# Patient Record
Sex: MALE | Race: WHITE | Employment: FULL TIME | URBAN - METROPOLITAN AREA
[De-identification: names, ages, dates, MRNs, and addresses within clinical notes are randomized per-mention and may not be internally consistent; named-entity substitution may affect disease eponyms.]

---

## 2018-05-23 ENCOUNTER — HOSPITAL ENCOUNTER (EMERGENCY)
Age: 48
Discharge: HOME | End: 2018-05-23

## 2018-05-23 VITALS
SYSTOLIC BLOOD PRESSURE: 156 MMHG | DIASTOLIC BLOOD PRESSURE: 96 MMHG | HEIGHT: 71 IN | TEMPERATURE: 98.7 F | WEIGHT: 165 LBS | HEART RATE: 99 BPM | BODY MASS INDEX: 23.1 KG/M2 | RESPIRATION RATE: 16 BRPM | OXYGEN SATURATION: 99 %

## 2018-05-23 DIAGNOSIS — H65.01 RIGHT ACUTE SEROUS OTITIS MEDIA, RECURRENCE NOT SPECIFIED: Primary | ICD-10-CM

## 2018-05-23 DIAGNOSIS — J02.9 ACUTE PHARYNGITIS, UNSPECIFIED ETIOLOGY: ICD-10-CM

## 2018-05-23 DIAGNOSIS — Z72.0 TOBACCO ABUSE DISORDER: ICD-10-CM

## 2018-05-23 PROCEDURE — 99282 EMERGENCY DEPT VISIT SF MDM: CPT

## 2018-05-23 RX ORDER — PREDNISONE 10 MG/1
TABLET ORAL
Qty: 20 TABLET | Refills: 0 | Status: SHIPPED | OUTPATIENT
Start: 2018-05-23 | End: 2018-06-02

## 2018-05-23 RX ORDER — AMOXICILLIN 500 MG/1
500 CAPSULE ORAL 3 TIMES DAILY
Qty: 30 CAPSULE | Refills: 0 | Status: SHIPPED | OUTPATIENT
Start: 2018-05-23 | End: 2018-06-02

## 2018-05-23 ASSESSMENT — PAIN DESCRIPTION - LOCATION: LOCATION: EAR;THROAT

## 2018-05-23 ASSESSMENT — PAIN DESCRIPTION - DESCRIPTORS: DESCRIPTORS: ACHING

## 2018-05-23 ASSESSMENT — PAIN DESCRIPTION - PAIN TYPE: TYPE: ACUTE PAIN

## 2018-05-23 ASSESSMENT — PAIN SCALES - GENERAL: PAINLEVEL_OUTOF10: 5

## 2018-05-23 ASSESSMENT — PAIN DESCRIPTION - ORIENTATION: ORIENTATION: RIGHT

## 2019-09-23 ENCOUNTER — OFFICE VISIT (OUTPATIENT)
Dept: FAMILY MEDICINE CLINIC | Age: 49
End: 2019-09-23
Payer: COMMERCIAL

## 2019-09-23 VITALS
HEART RATE: 103 BPM | TEMPERATURE: 97.2 F | DIASTOLIC BLOOD PRESSURE: 80 MMHG | WEIGHT: 151 LBS | SYSTOLIC BLOOD PRESSURE: 138 MMHG | BODY MASS INDEX: 21.14 KG/M2 | OXYGEN SATURATION: 100 % | HEIGHT: 71 IN

## 2019-09-23 DIAGNOSIS — R42 DIZZINESS: Primary | ICD-10-CM

## 2019-09-23 DIAGNOSIS — H65.03 NON-RECURRENT ACUTE SEROUS OTITIS MEDIA OF BOTH EARS: ICD-10-CM

## 2019-09-23 PROCEDURE — G8420 CALC BMI NORM PARAMETERS: HCPCS | Performed by: INTERNAL MEDICINE

## 2019-09-23 PROCEDURE — G8427 DOCREV CUR MEDS BY ELIG CLIN: HCPCS | Performed by: INTERNAL MEDICINE

## 2019-09-23 PROCEDURE — 99213 OFFICE O/P EST LOW 20 MIN: CPT | Performed by: INTERNAL MEDICINE

## 2019-09-23 PROCEDURE — 4004F PT TOBACCO SCREEN RCVD TLK: CPT | Performed by: INTERNAL MEDICINE

## 2019-09-23 PROCEDURE — 3017F COLORECTAL CA SCREEN DOC REV: CPT | Performed by: INTERNAL MEDICINE

## 2019-09-23 RX ORDER — PREDNISONE 10 MG/1
TABLET ORAL
Qty: 30 TABLET | Refills: 0 | Status: SHIPPED | OUTPATIENT
Start: 2019-09-23 | End: 2019-10-22

## 2019-09-23 RX ORDER — AMOXICILLIN 875 MG/1
875 TABLET, COATED ORAL 2 TIMES DAILY
Qty: 14 TABLET | Refills: 0 | Status: SHIPPED | OUTPATIENT
Start: 2019-09-23 | End: 2019-09-30

## 2019-09-23 NOTE — LETTER
[facility]  [address] [place] [id number]  Phone: [phone]  Fax: [phone] [place], [place]        September 23, 2019     Patient: Luz Camarillo   YOB: 1970   Date of Visit: 9/23/2019       To Whom it May Concern:    Luz Camarillo was seen in my clinic on 9/23/2019. He may return to work without restrictions on 9-  If you have any questions or concerns, please don't hesitate to call.     Sincerely,         [address] [facility], DO

## 2019-10-22 ENCOUNTER — OFFICE VISIT (OUTPATIENT)
Dept: FAMILY MEDICINE CLINIC | Age: 49
End: 2019-10-22
Payer: COMMERCIAL

## 2019-10-22 VITALS
WEIGHT: 153 LBS | TEMPERATURE: 98.7 F | HEART RATE: 110 BPM | HEIGHT: 71 IN | DIASTOLIC BLOOD PRESSURE: 90 MMHG | BODY MASS INDEX: 21.42 KG/M2 | OXYGEN SATURATION: 99 % | SYSTOLIC BLOOD PRESSURE: 142 MMHG

## 2019-10-22 DIAGNOSIS — R11.0 NAUSEA: Primary | ICD-10-CM

## 2019-10-22 PROCEDURE — G8484 FLU IMMUNIZE NO ADMIN: HCPCS | Performed by: INTERNAL MEDICINE

## 2019-10-22 PROCEDURE — 4004F PT TOBACCO SCREEN RCVD TLK: CPT | Performed by: INTERNAL MEDICINE

## 2019-10-22 PROCEDURE — G8420 CALC BMI NORM PARAMETERS: HCPCS | Performed by: INTERNAL MEDICINE

## 2019-10-22 PROCEDURE — 3017F COLORECTAL CA SCREEN DOC REV: CPT | Performed by: INTERNAL MEDICINE

## 2019-10-22 PROCEDURE — 99213 OFFICE O/P EST LOW 20 MIN: CPT | Performed by: INTERNAL MEDICINE

## 2019-10-22 PROCEDURE — G8427 DOCREV CUR MEDS BY ELIG CLIN: HCPCS | Performed by: INTERNAL MEDICINE

## 2019-10-22 RX ORDER — ONDANSETRON 4 MG/1
4 TABLET, FILM COATED ORAL 3 TIMES DAILY PRN
Qty: 15 TABLET | Refills: 0 | Status: SHIPPED
Start: 2019-10-22 | End: 2020-09-01 | Stop reason: ALTCHOICE

## 2020-01-31 LAB
CHOLESTEROL, TOTAL: 198 MG/DL
CHOLESTEROL/HDL RATIO: 3.7
HDLC SERPL-MCNC: 53 MG/DL (ref 35–70)
LDL CHOLESTEROL CALCULATED: 131 MG/DL (ref 0–160)
NONHDLC SERPL-MCNC: NORMAL MG/DL
TRIGL SERPL-MCNC: 69 MG/DL
VLDLC SERPL CALC-MCNC: 14 MG/DL

## 2020-09-01 ENCOUNTER — OFFICE VISIT (OUTPATIENT)
Dept: FAMILY MEDICINE CLINIC | Age: 50
End: 2020-09-01
Payer: COMMERCIAL

## 2020-09-01 ENCOUNTER — TELEPHONE (OUTPATIENT)
Dept: FAMILY MEDICINE CLINIC | Age: 50
End: 2020-09-01

## 2020-09-01 VITALS
OXYGEN SATURATION: 98 % | WEIGHT: 152 LBS | SYSTOLIC BLOOD PRESSURE: 144 MMHG | HEIGHT: 71 IN | TEMPERATURE: 98 F | BODY MASS INDEX: 21.28 KG/M2 | DIASTOLIC BLOOD PRESSURE: 82 MMHG | HEART RATE: 91 BPM

## 2020-09-01 PROBLEM — R73.9 HYPERGLYCEMIA, UNSPECIFIED: Status: RESOLVED | Noted: 2017-08-15 | Resolved: 2020-09-01

## 2020-09-01 PROBLEM — E78.1 PURE HYPERGLYCERIDEMIA: Status: RESOLVED | Noted: 2017-08-15 | Resolved: 2020-09-01

## 2020-09-01 PROBLEM — R74.01 NONSPECIFIC ELEVATION OF LEVELS OF TRANSAMINASE AND LACTIC ACID DEHYDROGENASE (LDH): Status: ACTIVE | Noted: 2017-08-15

## 2020-09-01 PROBLEM — F10.10 ALCOHOL ABUSE, UNCOMPLICATED: Status: ACTIVE | Noted: 2017-08-15

## 2020-09-01 PROBLEM — R74.02 NONSPECIFIC ELEVATION OF LEVELS OF TRANSAMINASE AND LACTIC ACID DEHYDROGENASE (LDH): Status: ACTIVE | Noted: 2017-08-15

## 2020-09-01 PROBLEM — R74.01 NONSPECIFIC ELEVATION OF LEVELS OF TRANSAMINASE AND LACTIC ACID DEHYDROGENASE (LDH): Status: RESOLVED | Noted: 2017-08-15 | Resolved: 2020-09-01

## 2020-09-01 PROBLEM — R74.02 NONSPECIFIC ELEVATION OF LEVELS OF TRANSAMINASE AND LACTIC ACID DEHYDROGENASE (LDH): Status: RESOLVED | Noted: 2017-08-15 | Resolved: 2020-09-01

## 2020-09-01 PROBLEM — F41.9 ANXIETY DISORDER: Status: ACTIVE | Noted: 2017-08-15

## 2020-09-01 PROBLEM — E78.1 PURE HYPERGLYCERIDEMIA: Status: ACTIVE | Noted: 2017-08-15

## 2020-09-01 PROBLEM — I25.10 CORONARY ARTERIOSCLEROSIS IN NATIVE ARTERY: Status: ACTIVE | Noted: 2017-08-15

## 2020-09-01 PROBLEM — R73.9 HYPERGLYCEMIA, UNSPECIFIED: Status: ACTIVE | Noted: 2017-08-15

## 2020-09-01 PROCEDURE — G8427 DOCREV CUR MEDS BY ELIG CLIN: HCPCS | Performed by: FAMILY MEDICINE

## 2020-09-01 PROCEDURE — G8420 CALC BMI NORM PARAMETERS: HCPCS | Performed by: FAMILY MEDICINE

## 2020-09-01 PROCEDURE — 99214 OFFICE O/P EST MOD 30 MIN: CPT | Performed by: FAMILY MEDICINE

## 2020-09-01 PROCEDURE — 3017F COLORECTAL CA SCREEN DOC REV: CPT | Performed by: FAMILY MEDICINE

## 2020-09-01 PROCEDURE — 4004F PT TOBACCO SCREEN RCVD TLK: CPT | Performed by: FAMILY MEDICINE

## 2020-09-01 RX ORDER — PREDNISONE 10 MG/1
TABLET ORAL
Qty: 30 TABLET | Refills: 0 | Status: SHIPPED
Start: 2020-09-01 | End: 2020-09-29

## 2020-09-01 RX ORDER — TIZANIDINE 4 MG/1
2-4 TABLET ORAL EVERY 8 HOURS PRN
Qty: 90 TABLET | Refills: 5 | Status: SHIPPED
Start: 2020-09-01 | End: 2021-07-20 | Stop reason: SDUPTHER

## 2020-09-01 ASSESSMENT — ENCOUNTER SYMPTOMS
VOICE CHANGE: 1
CONSTIPATION: 0
PHOTOPHOBIA: 0
ABDOMINAL PAIN: 0
VOMITING: 0
NAUSEA: 0
DIARRHEA: 0
COUGH: 0
BLOOD IN STOOL: 0
BACK PAIN: 0
SHORTNESS OF BREATH: 0
SORE THROAT: 0

## 2020-09-01 ASSESSMENT — PATIENT HEALTH QUESTIONNAIRE - PHQ9
1. LITTLE INTEREST OR PLEASURE IN DOING THINGS: 0
2. FEELING DOWN, DEPRESSED OR HOPELESS: 0
SUM OF ALL RESPONSES TO PHQ9 QUESTIONS 1 & 2: 0
SUM OF ALL RESPONSES TO PHQ QUESTIONS 1-9: 0
SUM OF ALL RESPONSES TO PHQ QUESTIONS 1-9: 0

## 2020-09-01 NOTE — TELEPHONE ENCOUNTER
giorgio at [facility] [place] [facility] radiology calling to ensure you received the XR cervical spine result from today. It is in and here is the impression;  1. Multilevel degenerative disc and degenerative joint disease most prominent    at the C4-5 through C6-7 levels.
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
## 2020-09-01 NOTE — LETTER
[address] [id number]  Phone: [phone]  Fax: [phone]    Gerson Dubois DO        September 1, 2020     Patient: Marly Esteban   YOB: 1970   Date of Visit: 9/1/2020       To Whom it May Concern:    Marly Esteban was seen in my clinic on 9/1/2020. He may return to work on 9/2/2020. If you have any questions or concerns, please don't hesitate to call.     Sincerely,         Dionne Whalen, DO

## 2020-09-17 ENCOUNTER — HOSPITAL ENCOUNTER (OUTPATIENT)
Dept: MRI IMAGING | Age: 50
Discharge: HOME | End: 2020-09-19
Payer: COMMERCIAL

## 2020-09-17 PROCEDURE — 6360000004 HC RX CONTRAST MEDICATION: Performed by: RADIOLOGY

## 2020-09-17 PROCEDURE — 72156 MRI NECK SPINE W/O & W/DYE: CPT

## 2020-09-17 PROCEDURE — A9579 GAD-BASE MR CONTRAST NOS,1ML: HCPCS | Performed by: RADIOLOGY

## 2020-09-17 RX ADMIN — GADOTERIDOL 14 ML: 279.3 INJECTION, SOLUTION INTRAVENOUS at 08:22

## 2020-09-29 ENCOUNTER — OFFICE VISIT (OUTPATIENT)
Dept: FAMILY MEDICINE CLINIC | Age: 50
End: 2020-09-29
Payer: COMMERCIAL

## 2020-09-29 VITALS
BODY MASS INDEX: 21.14 KG/M2 | HEIGHT: 71 IN | TEMPERATURE: 98.2 F | HEART RATE: 94 BPM | WEIGHT: 151 LBS | DIASTOLIC BLOOD PRESSURE: 84 MMHG | OXYGEN SATURATION: 99 % | SYSTOLIC BLOOD PRESSURE: 130 MMHG

## 2020-09-29 PROBLEM — M54.12 CERVICAL RADICULOPATHY: Status: ACTIVE | Noted: 2020-09-29

## 2020-09-29 PROBLEM — M48.02 CERVICAL SPINAL STENOSIS: Status: ACTIVE | Noted: 2020-09-29

## 2020-09-29 PROCEDURE — 99213 OFFICE O/P EST LOW 20 MIN: CPT | Performed by: FAMILY MEDICINE

## 2020-09-29 PROCEDURE — 3017F COLORECTAL CA SCREEN DOC REV: CPT | Performed by: FAMILY MEDICINE

## 2020-09-29 PROCEDURE — G8427 DOCREV CUR MEDS BY ELIG CLIN: HCPCS | Performed by: FAMILY MEDICINE

## 2020-09-29 PROCEDURE — 4004F PT TOBACCO SCREEN RCVD TLK: CPT | Performed by: FAMILY MEDICINE

## 2020-09-29 PROCEDURE — G8420 CALC BMI NORM PARAMETERS: HCPCS | Performed by: FAMILY MEDICINE

## 2020-09-29 RX ORDER — PREDNISONE 10 MG/1
10 TABLET ORAL DAILY
Qty: 30 TABLET | Refills: 0 | Status: SHIPPED | OUTPATIENT
Start: 2020-09-29 | End: 2020-10-29

## 2020-09-29 RX ORDER — GABAPENTIN 300 MG/1
CAPSULE ORAL
Qty: 90 CAPSULE | Refills: 3 | Status: SHIPPED
Start: 2020-09-29 | End: 2020-11-27 | Stop reason: ALTCHOICE

## 2020-09-29 ASSESSMENT — ENCOUNTER SYMPTOMS
VOMITING: 0
COUGH: 0
CONSTIPATION: 0
ABDOMINAL PAIN: 0
SHORTNESS OF BREATH: 0
BLOOD IN STOOL: 0
PHOTOPHOBIA: 0
NAUSEA: 0
BACK PAIN: 0
SORE THROAT: 0
VOICE CHANGE: 1
DIARRHEA: 0
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
## 2020-11-04 ENCOUNTER — INITIAL CONSULT (OUTPATIENT)
Dept: NEUROSURGERY | Age: 50
End: 2020-11-04
Payer: COMMERCIAL

## 2020-11-04 VITALS
SYSTOLIC BLOOD PRESSURE: 126 MMHG | BODY MASS INDEX: 21.7 KG/M2 | DIASTOLIC BLOOD PRESSURE: 84 MMHG | HEIGHT: 71 IN | TEMPERATURE: 99.5 F | HEART RATE: 93 BPM | WEIGHT: 155 LBS

## 2020-11-04 PROCEDURE — G8484 FLU IMMUNIZE NO ADMIN: HCPCS | Performed by: NEUROLOGICAL SURGERY

## 2020-11-04 PROCEDURE — G8427 DOCREV CUR MEDS BY ELIG CLIN: HCPCS | Performed by: NEUROLOGICAL SURGERY

## 2020-11-04 PROCEDURE — G8420 CALC BMI NORM PARAMETERS: HCPCS | Performed by: NEUROLOGICAL SURGERY

## 2020-11-04 PROCEDURE — 99243 OFF/OP CNSLTJ NEW/EST LOW 30: CPT | Performed by: NEUROLOGICAL SURGERY

## 2020-11-04 ASSESSMENT — ENCOUNTER SYMPTOMS
TROUBLE SWALLOWING: 0
VISUAL CHANGE: 0
GASTROINTESTINAL NEGATIVE: 1
RESPIRATORY NEGATIVE: 1
PHOTOPHOBIA: 0
ALLERGIC/IMMUNOLOGIC NEGATIVE: 1
EYES NEGATIVE: 1

## 2020-11-04 NOTE — PROGRESS NOTES
Neck pain  Subjective:      Patient ID: Cassandra Ortiz is a 44 y.o. male. Neck Pain    This is a chronic problem. The current episode started more than 1 year ago. The problem occurs constantly. The problem has been gradually worsening. The pain is associated with nothing. The pain is present in the anterior neck. The quality of the pain is described as aching. The pain is at a severity of 7/10. The pain is moderate. The symptoms are aggravated by position. The pain is same all the time. Stiffness is present in the morning. Associated symptoms include numbness and tingling. Pertinent negatives include no chest pain, fever, headaches, leg pain, pain with swallowing, paresis, photophobia, syncope, trouble swallowing, visual change, weakness or weight loss. He has tried heat for the symptoms. The treatment provided mild relief. Review of Systems   Constitutional: Negative. Negative for fever and weight loss. HENT: Negative. Negative for trouble swallowing. Eyes: Negative. Negative for photophobia. Respiratory: Negative. Cardiovascular: Negative. Negative for chest pain and syncope. Gastrointestinal: Negative. Endocrine: Negative. Genitourinary: Negative. Musculoskeletal: Positive for neck pain. Skin: Negative. Allergic/Immunologic: Negative. Neurological: Positive for tingling and numbness. Negative for weakness and headaches. Hematological: Negative. Psychiatric/Behavioral: Negative. Objective:   Physical Exam  Vitals signs reviewed. Constitutional:       General: He is not in acute distress. Appearance: Normal appearance. He is normal weight. He is not ill-appearing, toxic-appearing or diaphoretic. HENT:      Head: Normocephalic and atraumatic. Nose: Nose normal.   Eyes:      General: No visual field deficit or scleral icterus. Right eye: No discharge. Left eye: No discharge. Extraocular Movements: Extraocular movements intact. Conjunctiva/sclera: Conjunctivae normal.      Pupils: Pupils are equal, round, and reactive to light. Pulmonary:      Effort: Pulmonary effort is normal. No respiratory distress. Abdominal:      General: Abdomen is flat. There is no distension. Neurological:      General: No focal deficit present. Mental Status: He is alert and oriented to person, place, and time. Mental status is at baseline. GCS: GCS eye subscore is 4. GCS verbal subscore is 5. GCS motor subscore is 6. Cranial Nerves: Cranial nerves are intact. No cranial nerve deficit, dysarthria or facial asymmetry. Sensory: Sensation is intact. No sensory deficit. Motor: Motor function is intact. No weakness, tremor, atrophy, abnormal muscle tone or seizure activity. Coordination: Coordination is intact. Romberg sign negative. Coordination normal. Finger-Nose-Finger Test and Heel to [facility] Test normal. Rapid alternating movements normal.      Gait: Gait normal.      Deep Tendon Reflexes: Reflexes normal.      Reflex Scores:       Tricep reflexes are 2+ on the right side and 2+ on the left side. Bicep reflexes are 2+ on the right side and 2+ on the left side. Brachioradialis reflexes are 2+ on the right side and 2+ on the left side. Patellar reflexes are 2+ on the right side and 2+ on the left side. Achilles reflexes are 2+ on the right side and 2+ on the left side. Psychiatric:         Mood and Affect: Mood normal.         Behavior: Behavior normal.         Thought Content: Thought content normal.         Judgment: Judgment normal.         Assessment:      44year old male who presents with neck pain and mild stenosis. He also hs numbness in his arms and legs. Plan:      No role for surgical intervention.   Will try PT, epidurals and will get EMG        Shy Duckworth MD

## 2020-11-18 NOTE — PROGRESS NOTES
[facility]        [address]  [facility], [address] [facility]  Dept: [phone]          Consult Note      Patient:  ESTELA Miranda 1970    Date of Service:  20     Requesting Physician:  Carole Bridges MD    Reason for Consult:      Patient presents with complaints of bilateral neck pain that started 5 years ago    HISTORY OF PRESENT ILLNESS:      Pain is constant and is described as aching, dull, sharp and miserable. Pain does radiate to L>RUE. He  has numbness, tingling of the the upper extremities and does not have bladder or bowel dysfunction. Alleviating factors include: rest.  Aggravating factors include:  movement. He has not been on anticoagulation medications to include ASA, NSAIDS, Plavix, heparin, LMW heparin and warfarin and has not been on herbal supplements. He is not diabetic. Imagin cervical MRI -  FINDINGS:      Vertebral body height, alignment and marrow signal are normal.         Craniovertebral junction is unremarkable.         C2-3 and C3-4 are normal.         At C4-5 there is mild disc space narrowing and moderate anterior    posterior spurring. There is mild to moderate stenosis. There is no    cord compression.         At C5-6 there is mild disc space narrowing. There is minimal posterior    bridging hard disc and spur and minimal stenosis.         There are degenerative changes and mild disc space narrowing C6-7    without stenosis.         C7-T1 there is focal right-sided spur and hard disc with moderate    IMPRESSION on the ventral right side of the sac. There is no cord    compression.         Note is made of a sebaceous cyst in the subcutaneous fat, posteriorly    at the level T1.              Impression    Mild to moderate degenerative changes in the mid cervical spine. This    is most pronounced at C4-5 where there is mild to moderate stenosis.     There is also focal compression on the right side of the thecal sac children: Not on file    Years of education: Not on file    Highest education level: Not on file   Occupational History     Employer: [employer]. Social Needs    Financial resource strain: Not on file    Food insecurity     Worry: Not on file     Inability: Not on file    Transportation needs     Medical: Not on file     Non-medical: Not on file   Tobacco Use    Smoking status: Current Some Day Smoker     Packs/day: 2.00     Types: Cigarettes    Smokeless tobacco: Never Used   Substance and Sexual Activity    Alcohol use: Yes     Alcohol/week: 30.0 standard drinks     Types: 30 Cans of beer per week     Comment: couple every other weekend    Drug use: No    Sexual activity: Not on file   Lifestyle    Physical activity     Days per week: Not on file     Minutes per session: Not on file    Stress: Not on file   Relationships    Social connections     Talks on phone: Not on file     Gets together: Not on file     Attends Faith service: Not on file     Active member of club or organization: Not on file     Attends meetings of clubs or organizations: Not on file     Relationship status: Not on file    Intimate partner violence     Fear of current or ex partner: Not on file     Emotionally abused: Not on file     Physically abused: Not on file     Forced sexual activity: Not on file   Other Topics Concern    Not on file   Social History Narrative    Not on file       Family History   Problem Relation Age of Onset    Diabetes Mother     Cancer Mother     High Blood Pressure Mother     Diabetes Father     High Blood Pressure Father     Kidney Disease Father     High Blood Pressure Sister        REVIEW OF SYSTEMS:     Patient specifically denies fever/chills, chest pain, shortness of breath, new bowel or bladder complaints. All other review of systems was negative. PHYSICAL EXAMINATION:      There were no vitals taken for this visit.     General:      General appearance:pleasant and well-hydrated, in no distress and A & O x3  Build:Normal Weight  Function:Rises from a seated position easily. HEENT:    Head:normocephalic, atraumatic  Pupils:regular, round, equal  Sclera: icterus absent    Lungs:    Breathing:normal breathing pattern    Abdomen:    Shape:non-distended and normal  Tenderness:none  Guarding:none    Cervical spine:    Inspection:normal  Palpation:tenderness paravertebral muscles, tenderness trapezium, left, right positive. Range of motion:abnormal mildly flexion, extension rotation bilateral and is  painful.     Musculoskeletal:    Trigger points in trapezius:absent bilaterally  Trigger points in rhomboids:absent bilaterally  Trigger points in Paraveteral:absent bilaterally  Trigger points in supraspinatus/infraspinatus:absent  Spurling's:negative right, negative left    Daniels's:negative right, negative left     Extremities:    Tremors:None bilaterally upper and lower  Range of motion:Generally normal shoulders  Intact:Yes  Varicose veins:absent   Pulses:present Lt radial  Cyanosis:none  Edema:none x all 4 extremities    Neurological:    Sensory:normal to light touch BUE    Motor:     Right Grip5/5              Left Grip5/5               Right Bicep5/5           Left Bicep5/5              Right Triceps5/5       Left Triceps5/5          Right Deltoid5/5     Left Deltoid5/5                    Reflexes:      Right Brachioradialis reflex2+  Left Brachioradialis reflex2+  Right Biceps reflex2+  Left Biceps reflex2+  Right Triceps reflex2+  Left Triceps reflex2+    Gait:normal    Dermatology:    Skin:no rashes or lesions noted    Impression:    Cervical pain radiating into the L>RUE  2020 cervical MRI shows mild to moderate degenerative changes with mild to moderate central stenosis at C4-5 and a focal right-sided C7-T1 hard disc and spur with moderate impression on the thecal sac  Works as a dye setter  PMHx: asthma, GERD, CAD, excessive alcohol use  Had eval with NSGY who recommends membrane stabilizers, PT, and LANDRY's    Plan:    Reviewed referral documents and imaging  Urine screen deferred  OARRS report reviewed  Encouraged in PT scheduling although his work schedule may be time prohobitive  C7-T1 LANDRY #1 - r/b and procedure discussed  Patient encouraged to stay active   Treatment plan discussed with the patient including medication and procedure side effects     CC:  Referring physician    YAZMIN Horne.

## 2020-11-19 ENCOUNTER — PREP FOR PROCEDURE (OUTPATIENT)
Dept: PAIN MANAGEMENT | Age: 50
End: 2020-11-19

## 2020-11-19 ENCOUNTER — OFFICE VISIT (OUTPATIENT)
Dept: PAIN MANAGEMENT | Age: 50
End: 2020-11-19
Payer: COMMERCIAL

## 2020-11-19 VITALS
BODY MASS INDEX: 21.7 KG/M2 | OXYGEN SATURATION: 99 % | HEART RATE: 85 BPM | SYSTOLIC BLOOD PRESSURE: 128 MMHG | WEIGHT: 155 LBS | RESPIRATION RATE: 18 BRPM | TEMPERATURE: 97.2 F | HEIGHT: 71 IN | DIASTOLIC BLOOD PRESSURE: 82 MMHG

## 2020-11-19 PROBLEM — M50.90 CERVICAL DISC DISORDER: Status: ACTIVE | Noted: 2020-11-19

## 2020-11-19 PROBLEM — G89.4 CHRONIC PAIN SYNDROME: Status: ACTIVE | Noted: 2020-11-19

## 2020-11-19 PROBLEM — M54.2 CERVICALGIA: Status: ACTIVE | Noted: 2020-11-19

## 2020-11-19 PROCEDURE — 99204 OFFICE O/P NEW MOD 45 MIN: CPT | Performed by: PAIN MEDICINE

## 2020-11-19 PROCEDURE — G8427 DOCREV CUR MEDS BY ELIG CLIN: HCPCS | Performed by: PAIN MEDICINE

## 2020-11-19 PROCEDURE — G8484 FLU IMMUNIZE NO ADMIN: HCPCS | Performed by: PAIN MEDICINE

## 2020-11-19 PROCEDURE — 3017F COLORECTAL CA SCREEN DOC REV: CPT | Performed by: PAIN MEDICINE

## 2020-11-19 PROCEDURE — G8420 CALC BMI NORM PARAMETERS: HCPCS | Performed by: PAIN MEDICINE

## 2020-11-19 PROCEDURE — 4004F PT TOBACCO SCREEN RCVD TLK: CPT | Performed by: PAIN MEDICINE

## 2020-11-19 NOTE — PROGRESS NOTES
Do you currently have any of the following:    Fever: No  Headache:  No  Cough: No  Shortness of breath: No  Exposed to anyone with these symptoms: No                                                                                                                Cassandra Ortiz presents to the [facility] on 11/19/2020. Lisset Morgan is complaining of pain neck and shoulder . The pain is constant. The pain is described as aching, dull, sharp and miserable. Pain is rated on his best day at a 4, on his worst day at a 8, and on average at a 5 on the VAS scale. He took his last dose of Neurontin and zanaflex yesterday . Lisset Morgan does not have issues with constipation. Any procedures since your last visit: No, with  % relief. He is not on NSAIDS and  is not on anticoagulation medications to include none and is managed by . Pacemaker or defibrilator: No Physician managing device is . Medication Contract and Consent for Opioid Use Documents Filed      No documents found                   /82   Pulse 85   Temp 97.2 °F (36.2 °C) (Infrared)   Resp 18   Ht 5' 11\" (1.803 m)   Wt 155 lb (70.3 kg)   SpO2 99%   BMI 21.62 kg/m²      No LMP for male patient.

## 2020-11-23 ENCOUNTER — HOSPITAL ENCOUNTER (OUTPATIENT)
Dept: NEUROLOGY | Age: 50
Discharge: HOME | End: 2020-11-23
Payer: COMMERCIAL

## 2020-11-23 PROCEDURE — 95913 NRV CNDJ TEST 13/> STUDIES: CPT

## 2020-11-23 PROCEDURE — 95886 MUSC TEST DONE W/N TEST COMP: CPT
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
## 2020-11-27 RX ORDER — GABAPENTIN 300 MG/1
300 CAPSULE ORAL NIGHTLY
COMMUNITY
End: 2021-01-19 | Stop reason: SDUPTHER

## 2020-11-27 NOTE — PROGRESS NOTES
[facility]  INSTRUCTIONS  . .......................................................................................................................................... [] Parking the day of Surgery is located in the [facility].   Upon entering the door, someone will be there to greet you    []  Bring photo ID and insurance card     [] You may have a light breakfast day of procedure    []  Wear loose comfortable clothing    []  Please follow instructions for medications as given per Dr's office     [] Stop blood thinners as per Dr's office instructions    [] You can expect a call the business day prior to procedure to notify you of your arrival time     [] Please arrange for     []  Other instructions

## 2020-11-27 NOTE — PROGRESS NOTES

## 2020-12-01 ENCOUNTER — HOSPITAL ENCOUNTER (OUTPATIENT)
Dept: GENERAL RADIOLOGY | Age: 50
Setting detail: OUTPATIENT SURGERY
Discharge: HOME | End: 2020-12-03
Attending: PAIN MEDICINE
Payer: COMMERCIAL

## 2020-12-01 ENCOUNTER — HOSPITAL ENCOUNTER (OUTPATIENT)
Age: 50
Setting detail: OUTPATIENT SURGERY
Discharge: HOME | End: 2020-12-01
Attending: PAIN MEDICINE | Admitting: PAIN MEDICINE
Payer: COMMERCIAL

## 2020-12-01 VITALS
OXYGEN SATURATION: 98 % | RESPIRATION RATE: 16 BRPM | DIASTOLIC BLOOD PRESSURE: 99 MMHG | HEART RATE: 84 BPM | WEIGHT: 155 LBS | BODY MASS INDEX: 21.7 KG/M2 | HEIGHT: 71 IN | SYSTOLIC BLOOD PRESSURE: 147 MMHG | TEMPERATURE: 98.2 F

## 2020-12-01 PROCEDURE — 62321 NJX INTERLAMINAR CRV/THRC: CPT | Performed by: PAIN MEDICINE

## 2020-12-01 PROCEDURE — 6360000004 HC RX CONTRAST MEDICATION: Performed by: PAIN MEDICINE

## 2020-12-01 PROCEDURE — 6360000002 HC RX W HCPCS: Performed by: PAIN MEDICINE

## 2020-12-01 PROCEDURE — 7100000011 HC PHASE II RECOVERY - ADDTL 15 MIN: Performed by: PAIN MEDICINE

## 2020-12-01 PROCEDURE — 3209999900 FLUORO FOR SURGICAL PROCEDURES

## 2020-12-01 PROCEDURE — 3600000002 HC SURGERY LEVEL 2 BASE: Performed by: PAIN MEDICINE

## 2020-12-01 PROCEDURE — 2709999900 HC NON-CHARGEABLE SUPPLY: Performed by: PAIN MEDICINE

## 2020-12-01 PROCEDURE — 7100000010 HC PHASE II RECOVERY - FIRST 15 MIN: Performed by: PAIN MEDICINE

## 2020-12-01 PROCEDURE — 2500000003 HC RX 250 WO HCPCS: Performed by: PAIN MEDICINE

## 2020-12-01 PROCEDURE — 2580000003 HC RX 258: Performed by: PAIN MEDICINE

## 2020-12-01 RX ORDER — SODIUM CHLORIDE 9 MG/ML
INJECTION INTRAVENOUS PRN
Status: DISCONTINUED | OUTPATIENT
Start: 2020-12-01 | End: 2020-12-01 | Stop reason: ALTCHOICE

## 2020-12-01 RX ORDER — LIDOCAINE HYDROCHLORIDE 5 MG/ML
INJECTION, SOLUTION INFILTRATION; INTRAVENOUS PRN
Status: DISCONTINUED | OUTPATIENT
Start: 2020-12-01 | End: 2020-12-01 | Stop reason: ALTCHOICE

## 2020-12-01 RX ORDER — METHYLPREDNISOLONE ACETATE 40 MG/ML
INJECTION, SUSPENSION INTRA-ARTICULAR; INTRALESIONAL; INTRAMUSCULAR; SOFT TISSUE PRN
Status: DISCONTINUED | OUTPATIENT
Start: 2020-12-01 | End: 2020-12-01 | Stop reason: ALTCHOICE

## 2020-12-01 ASSESSMENT — PAIN SCALES - GENERAL
PAINLEVEL_OUTOF10: 5
PAINLEVEL_OUTOF10: 0

## 2020-12-01 ASSESSMENT — PAIN DESCRIPTION - PAIN TYPE: TYPE: CHRONIC PAIN

## 2020-12-01 ASSESSMENT — PAIN DESCRIPTION - DESCRIPTORS: DESCRIPTORS: ACHING;CONSTANT

## 2020-12-01 ASSESSMENT — PAIN DESCRIPTION - LOCATION: LOCATION: NECK

## 2020-12-01 ASSESSMENT — PAIN DESCRIPTION - FREQUENCY: FREQUENCY: CONTINUOUS

## 2020-12-01 ASSESSMENT — PAIN DESCRIPTION - ONSET: ONSET: ON-GOING

## 2020-12-01 NOTE — OP NOTE
2020    Patient: Alexandra Mai  :  1970  Age:  44 y.o. Sex:  male     PRE-PROCEDURE DIAGNOSIS: Cervical degenerative disc disease, cervical radicular pain. POST-PROCEDURE DIAGNOSIS: Same. PROCEDURE: Fluoroscopic guided cervical epidural steroid injection, #1 at C7-T1 level. SURGEON: YAZMIN Horne. ANESTHESIA: local    ESTIMATED BLOOD LOSS: None.  ______________________________________________________________________  BRIEF HISTORY:  Alexandra Mai comes in today for the first  therapeutic cervical epidural injection under fluoroscopic guidance. The potential complications of this procedure were discussed with the him again today. He has elected to undergo the aforementioned procedure. Linda complete History & Physical examination were reviewed in depth, a copy of which is in the chart. DESCRIPTION OF PROCEDURE:    After confirming written and informed consent, a time-out was performed and Linda name and date of birth, the procedure to be performed as well as the plan for the location of the needle insertion were confirmed. The patient was brought into the procedure room and placed in the prone position with the head flexed midline on the fluoroscopy table. A pillow was placed under the patient's chest and forehead to increase cervical interlaminar space. Standard monitors were placed, and vital signs were observed throughout the procedure. The area was prepped with chloraprep and the C7-T1 interspace was marked under fluoroscopy. The skin and subcutaneous tissues at the above level were anesthestized with 0.5% lidocaine. A # 18 gauge 3 1/2 tuohy epidural needle was inserted and advanced toward the inferior lamina until bony contact was made. The needle was then advanced superiorly toward the epidural space.  From this point on the loss of resistance technique with a 5 cc glass syringe was used to confirm entrance of the needle into the epidural space under intermittent lateral fluoroscopy. Once in the epidural space , negative aspiration for blood and CSF was confirmed . Epidural needle tip placement was confirmed by visualizing epidural spread of 2 ml of Omnipaque 240 in both live lateral and live AP fluoroscopic views. Then after negative aspiration, a solution of preservative free saline, 2 ml, and 40 mg DepoMedrol was easily injected. The needle was gently removed intact. The patient neck was cleaned and a Band-Aid was placed over the needle insertion point. Disposition the patient tolerated the procedure well and there were no complications . Vital signs remained stable throughout the procedure. The patient was escorted to the recovery area where they remained until discharge and written discharge instructions for the procedure were given. Plan: Lindsay Hart will return to our pain management center as scheduled.      Silvia Hurtado DO

## 2020-12-01 NOTE — H&P
[facility]        [address] [facility], [address]  Dept: [phone]    Procedure History & Physical      Ardith Padilla     HPI:    Patient  is here for cervical and BUE pain for C7-T1 LANDRY  Labs/imaging studies reviewed   All question and concerns addressed including R/B/A associated with the procedure    Past Medical History:   Diagnosis Date    Arthritis     Asthma     GERD (gastroesophageal reflux disease)     Pure hyperglyceridemia 8/15/2017       Past Surgical History:   Procedure Laterality Date    CARDIAC CATHETERIZATION  8-    Dr. Salinas Emms      fusion c5,6,7    NECK SURGERY      fusion    WISDOM TOOTH EXTRACTION         Prior to Admission medications    Medication Sig Start Date End Date Taking? Authorizing Provider   gabapentin (NEURONTIN) 300 MG capsule Take 300 mg by mouth nightly. Yes Historical Provider, MD   tiZANidine (ZANAFLEX) 4 MG tablet Take 0.5-1 tablets by mouth every 8 hours as needed (Neck pain) 9/1/20  Yes Danish Whalen, DO   esomeprazole Magnesium (NEXIUM) 40 MG PACK Take 20 mg by mouth daily    Yes Historical Provider, MD       No Known Allergies    Social History     Socioeconomic History    Marital status:      Spouse name: Not on file    Number of children: Not on file    Years of education: Not on file    Highest education level: Not on file   Occupational History     Employer: [employer]. Social Needs    Financial resource strain: Not on file    Food insecurity     Worry: Not on file     Inability: Not on file    Transportation needs     Medical: Not on file     Non-medical: Not on file   Tobacco Use    Smoking status: Current Some Day Smoker     Packs/day: 1.00     Years: 30.00     Pack years: 30.00     Types: Cigarettes    Smokeless tobacco: Never Used   Substance and Sexual Activity    Alcohol use: Yes     Alcohol/week: 20.0 standard drinks     Types: 20 Cans of beer per week    Drug use:  No  Sexual activity: Not on file   Lifestyle    Physical activity     Days per week: Not on file     Minutes per session: Not on file    Stress: Not on file   Relationships    Social connections     Talks on phone: Not on file     Gets together: Not on file     Attends Oriental orthodox service: Not on file     Active member of club or organization: Not on file     Attends meetings of clubs or organizations: Not on file     Relationship status: Not on file    Intimate partner violence     Fear of current or ex partner: Not on file     Emotionally abused: Not on file     Physically abused: Not on file     Forced sexual activity: Not on file   Other Topics Concern    Not on file   Social History Narrative    Not on file       Family History   Problem Relation Age of Onset    Diabetes Mother     Cancer Mother     High Blood Pressure Mother     Diabetes Father     High Blood Pressure Father     Kidney Disease Father     High Blood Pressure Sister          REVIEW OF SYSTEMS:    CONSTITUTIONAL:  negative for  fevers, chills, sweats and fatigue    RESPIRATORY:  negative for  dry cough, cough with sputum, dyspnea, wheezing and chest pain    CARDIOVASCULAR:  negative for chest pain, dyspnea, palpitations, syncope    GASTROINTESTINAL:  negative for nausea, vomiting, change in bowel habits, diarrhea, constipation and abdominal pain    MUSCULOSKELETAL: negative for muscle weakness    SKIN: negative for itching or rashes.     BEHAVIOR/PSYCH:  negative for poor appetite, increased appetite, decreased sleep and poor concentration    All other systems negative      PHYSICAL EXAM:    VITALS:  BP (!) 151/99   Pulse 96   Resp 18   Ht 5' 11\" (1.803 m)   Wt 155 lb (70.3 kg)   SpO2 98%   BMI 21.62 kg/m²     CONSTITUTIONAL:  awake, alert, cooperative, no apparent distress, and appears stated age    EYES: PERRLA, EOMI    LUNGS:  No increased work of breathing, no audible wheezing    CARDIOVASCULAR:  regular rate and rhythm    ABDOMEN:  Soft non tender non distended     EXTREMITIES: no signs of clubbing or cyanosis. MUSCULOSKELETAL: negative for flaccid muscle tone or spastic movements. SKIN: gross examination reveals no signs of rashes, or diaphoresis. NEURO: Cranial nerves II-XII grossly intact. No signs of agitated mood.        Assessment/Plan:    Cervical and BUE pain for C7-T1 LANDRY

## 2020-12-17 ENCOUNTER — OFFICE VISIT (OUTPATIENT)
Dept: PAIN MANAGEMENT | Age: 50
End: 2020-12-17
Payer: COMMERCIAL

## 2020-12-17 ENCOUNTER — PREP FOR PROCEDURE (OUTPATIENT)
Dept: PAIN MANAGEMENT | Age: 50
End: 2020-12-17

## 2020-12-17 VITALS
HEART RATE: 92 BPM | DIASTOLIC BLOOD PRESSURE: 82 MMHG | RESPIRATION RATE: 16 BRPM | SYSTOLIC BLOOD PRESSURE: 128 MMHG | TEMPERATURE: 97.7 F

## 2020-12-17 PROCEDURE — G8484 FLU IMMUNIZE NO ADMIN: HCPCS | Performed by: PAIN MEDICINE

## 2020-12-17 PROCEDURE — 4004F PT TOBACCO SCREEN RCVD TLK: CPT | Performed by: PAIN MEDICINE

## 2020-12-17 PROCEDURE — 99213 OFFICE O/P EST LOW 20 MIN: CPT

## 2020-12-17 PROCEDURE — 3017F COLORECTAL CA SCREEN DOC REV: CPT | Performed by: PAIN MEDICINE

## 2020-12-17 PROCEDURE — 99213 OFFICE O/P EST LOW 20 MIN: CPT | Performed by: PAIN MEDICINE

## 2020-12-17 PROCEDURE — G8427 DOCREV CUR MEDS BY ELIG CLIN: HCPCS | Performed by: PAIN MEDICINE

## 2020-12-17 PROCEDURE — G8420 CALC BMI NORM PARAMETERS: HCPCS | Performed by: PAIN MEDICINE

## 2020-12-17 NOTE — PROGRESS NOTES
Do you currently have any of the following:    Fever: No  Headache:  No  Cough: No  Shortness of breath: No  Exposed to anyone with these symptoms: No         Tushar Pierce presents to the [facility] on 12/17/2020. Jarett Rendon is complaining of pain in his neck. The pain is persistent. The pain is described as aching. Pain is rated on his best day at a 5, on his worst day at a 10, and on average at a 7 on the VAS scale. He took his last dose of Neurontin last night. Any procedures since your last visit: Yes, cervical epidural steroid injection, #1 at C7-T1 with 50 % relief. Pacemaker or defibrilator: No managed by N/A. He is not on NSAIDS and is not on anticoagulation medications. /82   Pulse 92   Temp 97.7 °F (36.5 °C)   Resp 16      No LMP for male patient.

## 2020-12-17 NOTE — PROGRESS NOTES
[facility]        [address]  [facility], [address] [place] [address]  Dept: [phone]        Follow up Note      Leyda Sahu     Date of Visit:  20     CC:  Patient presents for follow up   Chief Complaint   Patient presents with    Follow-up     cervical epidural steroid injection, #1 at C7-T1     Neck Pain       HPI:    Pain is better. Change in quality of symptoms:no. Medication side effects:none. Recent diagnostic testing:none. Recent interventional procedures:C7-T1 LANDRY with 50% relief. He has not been on anticoagulation medications to include ASA, NSAIDS, Plavix, heparin, LMW heparin and warfarin and has not been on herbal supplements. He is not diabetic.     Imagin cervical MRI -  FINDINGS:      Vertebral body height, alignment and marrow signal are normal.         Craniovertebral junction is unremarkable.         C2-3 and C3-4 are normal.         At C4-5 there is mild disc space narrowing and moderate anterior    posterior spurring. There is mild to moderate stenosis. There is no    cord compression.         At C5-6 there is mild disc space narrowing. There is minimal posterior    bridging hard disc and spur and minimal stenosis.         There are degenerative changes and mild disc space narrowing C6-7    without stenosis.         C7-T1 there is focal right-sided spur and hard disc with moderate    IMPRESSION on the ventral right side of the sac. There is no cord    compression.         Note is made of a sebaceous cyst in the subcutaneous fat, posteriorly    at the level T1.              Impression    Mild to moderate degenerative changes in the mid cervical spine. This    is most pronounced at C4-5 where there is mild to moderate stenosis.     There is also focal compression on the right side of the thecal sac    secondary to hard disc and spur at C7-T1        xray cervical -   FINDINGS:    There is anatomic alignment of the cervical spine.  No compression fracture    is noted.         Multilevel degenerative disc disease is noted.  The degenerative disc disease    is most prominent at the C4-5, C5-6 and C6-7 levels.         Multilevel facet arthropathy is noted.         On the oblique views mild bony neural foraminal narrowing is noted    bilaterally at the C4-5 and C5-6 levels.         The prevertebral soft tissues are unremarkable.  The airway is widely patent.              Impression    1. Multilevel degenerative disc and degenerative joint disease most prominent    at the C4-5 through C6-7 levels. Potential Aberrant Drug-Related Behavior:      Urine Drug Screening:    OARRS report:    Past Medical History:   Diagnosis Date    Arthritis     Asthma     GERD (gastroesophageal reflux disease)     Pure hyperglyceridemia 8/15/2017       Past Surgical History:   Procedure Laterality Date    CARDIAC CATHETERIZATION  8-    Dr. Lima Da Silva      fusion c5,6,7    NECK SURGERY      fusion    PAIN MANAGEMENT PROCEDURE N/A 12/1/2020    C7-T1 EPIDURAL STEROID INJECTION #1 performed by Scotty Callahan DO at [facility]         Prior to Admission medications    Medication Sig Start Date End Date Taking? Authorizing Provider   gabapentin (NEURONTIN) 300 MG capsule Take 300 mg by mouth nightly. Yes Historical Provider, MD   tiZANidine (ZANAFLEX) 4 MG tablet Take 0.5-1 tablets by mouth every 8 hours as needed (Neck pain) 9/1/20  Yes Danish Whalen DO   esomeprazole (NEXIUM) 20 MG delayed release capsule Take 20 mg by mouth daily    Yes Historical Provider, MD       No Known Allergies    Social History     Socioeconomic History    Marital status:      Spouse name: Not on file    Number of children: Not on file    Years of education: Not on file    Highest education level: Not on file   Occupational History     Employer: [employer]    Social Needs    Financial resource strain: Not on file    Food insecurity     Worry: Not on file     Inability: Not on file    Transportation needs     Medical: Not on file     Non-medical: Not on file   Tobacco Use    Smoking status: Current Some Day Smoker     Packs/day: 1.00     Years: 30.00     Pack years: 30.00     Types: Cigarettes    Smokeless tobacco: Never Used   Substance and Sexual Activity    Alcohol use: Yes     Alcohol/week: 20.0 standard drinks     Types: 20 Cans of beer per week    Drug use: No    Sexual activity: Not on file   Lifestyle    Physical activity     Days per week: Not on file     Minutes per session: Not on file    Stress: Not on file   Relationships    Social connections     Talks on phone: Not on file     Gets together: Not on file     Attends Catholic service: Not on file     Active member of club or organization: Not on file     Attends meetings of clubs or organizations: Not on file     Relationship status: Not on file    Intimate partner violence     Fear of current or ex partner: Not on file     Emotionally abused: Not on file     Physically abused: Not on file     Forced sexual activity: Not on file   Other Topics Concern    Not on file   Social History Narrative    Not on file       Family History   Problem Relation Age of Onset    Diabetes Mother     Cancer Mother     High Blood Pressure Mother     Diabetes Father     High Blood Pressure Father     Kidney Disease Father     High Blood Pressure Sister        REVIEW OF SYSTEMS:     Jarocho Alon denies fever/chills, chest pain, shortness of breath, new bowel or bladder complaints. All other review of systems was negative.     PHYSICAL EXAMINATION:      /82   Pulse 92   Temp 97.7 °F (36.5 °C)   Resp 16     General:       General appearance:pleasant and well-hydrated, in no distress and A & O x3  Build:Normal Weight  Function:Rises from a seated position easily.     HEENT:     Head:normocephalic, atraumatic  Pupils:regular, round, equal  Sclera: icterus absent     Lungs:     Breathing:normal breathing pattern     Abdomen:     Shape:non-distended and normal  Tenderness:none  Guarding:none     Cervical spine:     Inspection:normal  Palpation:tenderness paravertebral muscles, tenderness trapezium, left, right positive - improved.   Range of motion:abnormal mildly flexion, extension rotation bilateral and is  painful.     Musculoskeletal:     Trigger points in trapezius:absent bilaterally  Trigger points in rhomboids:absent bilaterally  Trigger points in Paraveteral:absent bilaterally  Trigger points in supraspinatus/infraspinatus:absent  Spurling's:negative right, negative left    Daniels's:negative right, negative left      Extremities:     Tremors:None bilaterally upper and lower  Range of motion:Generally normal shoulders  Intact:Yes  Varicose veins:absent   Pulses:present Lt radial  Cyanosis:none  Edema:none x all 4 extremities     Neurological:     Sensory:normal to light touch BUE     Motor:      Right Grip5/5              Left Grip5/5               Right Bicep5/5           Left Bicep5/5              Right Triceps5/5       Left Triceps5/5          Right Deltoid5/5     Left Deltoid5/5                     Reflexes:       Right Brachioradialis reflex2+  Left Brachioradialis reflex2+  Right Biceps reflex2+  Left Biceps reflex2+  Right Triceps reflex2+  Left Triceps reflex2+     Gait:normal     Dermatology:     Skin:no rashes or lesions noted     Impression:     Cervical pain radiating into the L>RUE  2020 cervical MRI shows mild to moderate degenerative changes with mild to moderate central stenosis at C4-5 and a focal right-sided C7-T1 hard disc and spur with moderate impression on the thecal sac  Works as a dye setter  PMHx: asthma, GERD, CAD, excessive alcohol use  Had eval with NSGY who recommends membrane stabilizers, PT, and LANDRY's     Plan:     Reviewed referral documents and imaging  Urine screen deferred  OARRS report reviewed  Encouraged in PT scheduling although his work schedule may be time prohobitive  C7-T1 LANDRY #1 with 50% relief, repeat  Patient encouraged to stay active   Treatment plan discussed with the patient including medication and procedure side effects     Cc:  Referring physician    YAZMIN Horne.

## 2021-01-04 NOTE — PROGRESS NOTES

## 2021-01-04 NOTE — PROGRESS NOTES
[facility]  INSTRUCTIONS  . .......................................................................................................................................... [x] Parking the day of Surgery is located in the [facility].   Upon entering the door, someone will be there to greet you    [x]  Bring photo ID and insurance card     [x] You may have a light breakfast day of procedure    [x]  Wear loose comfortable clothing    [x]  Please follow instructions for medications as given per Dr's office     [x] Stop blood thinners as per Dr's office instructions    [x] You can expect a call the business day prior to procedure to notify you of your arrival time     [x] Please arrange for     []  Other instructions

## 2021-01-08 ENCOUNTER — HOSPITAL ENCOUNTER (OUTPATIENT)
Age: 51
Discharge: HOME | End: 2021-01-10
Payer: COMMERCIAL

## 2021-01-08 DIAGNOSIS — Z01.818 PREOP TESTING: ICD-10-CM

## 2021-01-08 PROCEDURE — U0003 INFECTIOUS AGENT DETECTION BY NUCLEIC ACID (DNA OR RNA); SEVERE ACUTE RESPIRATORY SYNDROME CORONAVIRUS 2 (SARS-COV-2) (CORONAVIRUS DISEASE [COVID-19]), AMPLIFIED PROBE TECHNIQUE, MAKING USE OF HIGH THROUGHPUT TECHNOLOGIES AS DESCRIBED BY CMS-2020-01-R: HCPCS

## 2021-01-09 LAB — SARS-COV-2, PCR: NOT DETECTED

## 2021-01-14 ENCOUNTER — HOSPITAL ENCOUNTER (OUTPATIENT)
Dept: GENERAL RADIOLOGY | Age: 51
Setting detail: OUTPATIENT SURGERY
Discharge: HOME | End: 2021-01-16
Attending: PAIN MEDICINE
Payer: COMMERCIAL

## 2021-01-14 ENCOUNTER — HOSPITAL ENCOUNTER (OUTPATIENT)
Age: 51
Setting detail: OUTPATIENT SURGERY
Discharge: HOME | End: 2021-01-14
Attending: PAIN MEDICINE | Admitting: PAIN MEDICINE
Payer: COMMERCIAL

## 2021-01-14 VITALS
BODY MASS INDEX: 21.7 KG/M2 | TEMPERATURE: 97.5 F | WEIGHT: 155 LBS | HEART RATE: 78 BPM | RESPIRATION RATE: 15 BRPM | DIASTOLIC BLOOD PRESSURE: 91 MMHG | OXYGEN SATURATION: 99 % | HEIGHT: 71 IN | SYSTOLIC BLOOD PRESSURE: 157 MMHG

## 2021-01-14 DIAGNOSIS — Z01.818 PREOP TESTING: Primary | ICD-10-CM

## 2021-01-14 DIAGNOSIS — R52 PAIN MANAGEMENT: ICD-10-CM

## 2021-01-14 PROCEDURE — 3209999900 FLUORO FOR SURGICAL PROCEDURES

## 2021-01-14 PROCEDURE — 2580000003 HC RX 258: Performed by: PAIN MEDICINE

## 2021-01-14 PROCEDURE — 2500000003 HC RX 250 WO HCPCS: Performed by: PAIN MEDICINE

## 2021-01-14 PROCEDURE — 2709999900 HC NON-CHARGEABLE SUPPLY: Performed by: PAIN MEDICINE

## 2021-01-14 PROCEDURE — 7100000011 HC PHASE II RECOVERY - ADDTL 15 MIN: Performed by: PAIN MEDICINE

## 2021-01-14 PROCEDURE — 6360000002 HC RX W HCPCS: Performed by: PAIN MEDICINE

## 2021-01-14 PROCEDURE — 3600000002 HC SURGERY LEVEL 2 BASE: Performed by: PAIN MEDICINE

## 2021-01-14 PROCEDURE — 7100000010 HC PHASE II RECOVERY - FIRST 15 MIN: Performed by: PAIN MEDICINE

## 2021-01-14 PROCEDURE — 62321 NJX INTERLAMINAR CRV/THRC: CPT | Performed by: PAIN MEDICINE

## 2021-01-14 PROCEDURE — 6360000004 HC RX CONTRAST MEDICATION: Performed by: PAIN MEDICINE

## 2021-01-14 RX ORDER — LIDOCAINE HYDROCHLORIDE 5 MG/ML
INJECTION, SOLUTION INFILTRATION; INTRAVENOUS PRN
Status: DISCONTINUED | OUTPATIENT
Start: 2021-01-14 | End: 2021-01-14 | Stop reason: ALTCHOICE

## 2021-01-14 RX ORDER — SODIUM CHLORIDE 9 MG/ML
INJECTION INTRAVENOUS PRN
Status: DISCONTINUED | OUTPATIENT
Start: 2021-01-14 | End: 2021-01-14 | Stop reason: ALTCHOICE

## 2021-01-14 RX ORDER — METHYLPREDNISOLONE ACETATE 40 MG/ML
INJECTION, SUSPENSION INTRA-ARTICULAR; INTRALESIONAL; INTRAMUSCULAR; SOFT TISSUE PRN
Status: DISCONTINUED | OUTPATIENT
Start: 2021-01-14 | End: 2021-01-14 | Stop reason: ALTCHOICE

## 2021-01-14 ASSESSMENT — PAIN DESCRIPTION - DESCRIPTORS
DESCRIPTORS: ACHING
DESCRIPTORS: ACHING

## 2021-01-14 NOTE — H&P
[facility]        [address] [facility], [address]  Dept: [phone]    Procedure History & Physical      Mayco Manuela     HPI:    Patient  is here for cervical and BUE pain for C7-T1 LANDRY  Labs/imaging studies reviewed   All question and concerns addressed including R/B/A associated with the procedure    Past Medical History:   Diagnosis Date    Arthritis     GERD (gastroesophageal reflux disease)        Past Surgical History:   Procedure Laterality Date    CARDIAC CATHETERIZATION  8-    Dr. Sary Palmer      fusion c5,6,7    NECK SURGERY      fusion    PAIN MANAGEMENT PROCEDURE N/A 12/1/2020    C7-T1 EPIDURAL STEROID INJECTION #1 performed by Rashaun Boss DO at [facility]         Prior to Admission medications    Medication Sig Start Date End Date Taking? Authorizing Provider   gabapentin (NEURONTIN) 300 MG capsule Take 300 mg by mouth nightly. Yes Historical Provider, MD   tiZANidine (ZANAFLEX) 4 MG tablet Take 0.5-1 tablets by mouth every 8 hours as needed (Neck pain) 9/1/20  Yes Danish Whalen DO   esomeprazole (NEXIUM) 20 MG delayed release capsule Take 20 mg by mouth daily    Yes Historical Provider, MD       No Known Allergies    Social History     Socioeconomic History    Marital status:      Spouse name: Not on file    Number of children: Not on file    Years of education: Not on file    Highest education level: Not on file   Occupational History     Employer: [employer]. Social Needs    Financial resource strain: Not on file    Food insecurity     Worry: Not on file     Inability: Not on file    Transportation needs     Medical: Not on file     Non-medical: Not on file   Tobacco Use    Smoking status: Current Every Day Smoker     Packs/day: 1.00     Years: 30.00     Pack years: 30.00     Types: Cigarettes    Smokeless tobacco: Never Used   Substance and Sexual Activity    Alcohol use:  Yes Comment: WEEKENDS 6 BEERS    Drug use: No    Sexual activity: Not on file   Lifestyle    Physical activity     Days per week: Not on file     Minutes per session: Not on file    Stress: Not on file   Relationships    Social connections     Talks on phone: Not on file     Gets together: Not on file     Attends Yazidism service: Not on file     Active member of club or organization: Not on file     Attends meetings of clubs or organizations: Not on file     Relationship status: Not on file    Intimate partner violence     Fear of current or ex partner: Not on file     Emotionally abused: Not on file     Physically abused: Not on file     Forced sexual activity: Not on file   Other Topics Concern    Not on file   Social History Narrative    Not on file       Family History   Problem Relation Age of Onset    Diabetes Mother     Cancer Mother     High Blood Pressure Mother     Diabetes Father     High Blood Pressure Father     Kidney Disease Father     High Blood Pressure Sister          REVIEW OF SYSTEMS:    CONSTITUTIONAL:  negative for  fevers, chills, sweats and fatigue    RESPIRATORY:  negative for  dry cough, cough with sputum, dyspnea, wheezing and chest pain    CARDIOVASCULAR:  negative for chest pain, dyspnea, palpitations, syncope    GASTROINTESTINAL:  negative for nausea, vomiting, change in bowel habits, diarrhea, constipation and abdominal pain    MUSCULOSKELETAL: negative for muscle weakness    SKIN: negative for itching or rashes.     BEHAVIOR/PSYCH:  negative for poor appetite, increased appetite, decreased sleep and poor concentration    All other systems negative      PHYSICAL EXAM:    VITALS:  BP (!) 145/94   Pulse 83   Temp 97.8 °F (36.6 °C) (Temporal)   Resp 20   Ht 5' 11\" (1.803 m)   Wt 155 lb (70.3 kg)   SpO2 100%   BMI 21.62 kg/m²     CONSTITUTIONAL:  awake, alert, cooperative, no apparent distress, and appears stated age    EYES: PERRLA, EOMI    LUNGS:  No increased work of breathing, no audible wheezing    CARDIOVASCULAR:  regular rate and rhythm    ABDOMEN:  Soft non tender non distended     EXTREMITIES: no signs of clubbing or cyanosis. MUSCULOSKELETAL: negative for flaccid muscle tone or spastic movements. SKIN: gross examination reveals no signs of rashes, or diaphoresis. NEURO: Cranial nerves II-XII grossly intact. No signs of agitated mood.        Assessment/Plan:    Cervical and BUE pain for C7-T1 LANDRY

## 2021-01-14 NOTE — OP NOTE
2021    Patient: Rciardo Mejia  :  1970  Age:  44 y.o. Sex:  male     PRE-PROCEDURE DIAGNOSIS: Cervical degenerative disc disease, cervical radicular pain. POST-PROCEDURE DIAGNOSIS: Same. PROCEDURE: Fluoroscopic guided cervical epidural steroid injection, #2 at C7-T1 level. SURGEON: YAZMIN Madrigal. ANESTHESIA: local    ESTIMATED BLOOD LOSS: None.  ______________________________________________________________________  BRIEF HISTORY:  Ricardo Mejia comes in today for the second  therapeutic cervical epidural injection under fluoroscopic guidance. The potential complications of this procedure were discussed with the him again today. He has elected to undergo the aforementioned procedure. Linda complete History & Physical examination were reviewed in depth, a copy of which is in the chart. DESCRIPTION OF PROCEDURE:    After confirming written and informed consent, a time-out was performed and Linda name and date of birth, the procedure to be performed as well as the plan for the location of the needle insertion were confirmed. The patient was brought into the procedure room and placed in the prone position with the head flexed midline on the fluoroscopy table. A pillow was placed under the patient's chest and forehead to increase cervical interlaminar space. Standard monitors were placed, and vital signs were observed throughout the procedure. The area was prepped with chloraprep and the C7-T1 interspace was marked under fluoroscopy. The skin and subcutaneous tissues at the above level were anesthestized with 0.5% lidocaine. A # 18 gauge 3 1/2 tuohy epidural needle was inserted and advanced toward the inferior lamina until bony contact was made. The needle was then advanced superiorly toward the epidural space.  From this point on the loss of resistance technique with a 5 cc glass syringe was used to confirm entrance of the needle into the epidural space under intermittent lateral fluoroscopy. Once in the epidural space , negative aspiration for blood and CSF was confirmed . Epidural needle tip placement was confirmed by visualizing epidural spread of 2 ml of Omnipaque 240 in both live lateral and live AP fluoroscopic views. Then after negative aspiration, a solution of preservative free saline, 2 ml, and 40 mg DepoMedrol was easily injected. The needle was gently removed intact. The patient neck was cleaned and a Band-Aid was placed over the needle insertion point. Disposition the patient tolerated the procedure well and there were no complications . Vital signs remained stable throughout the procedure. The patient was escorted to the recovery area where they remained until discharge and written discharge instructions for the procedure were given. Plan: Tom Avery will return to our pain management center as scheduled.      Jaison Ritchie, DO

## 2021-01-14 NOTE — PROGRESS NOTES
Patient has a ride to go home. Discharge instructions reviewed with patient, copy given. Denies questions or concerns.

## 2021-01-19 ENCOUNTER — OFFICE VISIT (OUTPATIENT)
Dept: FAMILY MEDICINE CLINIC | Age: 51
End: 2021-01-19
Payer: COMMERCIAL

## 2021-01-19 VITALS
BODY MASS INDEX: 21.56 KG/M2 | SYSTOLIC BLOOD PRESSURE: 138 MMHG | OXYGEN SATURATION: 97 % | HEART RATE: 84 BPM | DIASTOLIC BLOOD PRESSURE: 94 MMHG | RESPIRATION RATE: 18 BRPM | TEMPERATURE: 98.2 F | WEIGHT: 154 LBS | HEIGHT: 71 IN

## 2021-01-19 DIAGNOSIS — R49.0 HOARSENESS OF VOICE: ICD-10-CM

## 2021-01-19 DIAGNOSIS — F41.9 ANXIETY DISORDER, UNSPECIFIED TYPE: ICD-10-CM

## 2021-01-19 DIAGNOSIS — F17.200 SMOKING: ICD-10-CM

## 2021-01-19 DIAGNOSIS — M48.02 CERVICAL SPINAL STENOSIS: Primary | ICD-10-CM

## 2021-01-19 DIAGNOSIS — G89.4 CHRONIC PAIN SYNDROME: ICD-10-CM

## 2021-01-19 DIAGNOSIS — M54.2 CERVICALGIA: ICD-10-CM

## 2021-01-19 DIAGNOSIS — E78.2 MIXED HYPERLIPIDEMIA: ICD-10-CM

## 2021-01-19 PROCEDURE — 99213 OFFICE O/P EST LOW 20 MIN: CPT | Performed by: FAMILY MEDICINE

## 2021-01-19 PROCEDURE — 4004F PT TOBACCO SCREEN RCVD TLK: CPT | Performed by: FAMILY MEDICINE

## 2021-01-19 PROCEDURE — G8484 FLU IMMUNIZE NO ADMIN: HCPCS | Performed by: FAMILY MEDICINE

## 2021-01-19 PROCEDURE — G8427 DOCREV CUR MEDS BY ELIG CLIN: HCPCS | Performed by: FAMILY MEDICINE

## 2021-01-19 PROCEDURE — 3017F COLORECTAL CA SCREEN DOC REV: CPT | Performed by: FAMILY MEDICINE

## 2021-01-19 PROCEDURE — G8420 CALC BMI NORM PARAMETERS: HCPCS | Performed by: FAMILY MEDICINE

## 2021-01-19 RX ORDER — GABAPENTIN 300 MG/1
300 CAPSULE ORAL NIGHTLY
Qty: 90 CAPSULE | Refills: 3 | Status: SHIPPED
Start: 2021-01-19 | End: 2021-07-20 | Stop reason: SDUPTHER

## 2021-01-19 ASSESSMENT — PATIENT HEALTH QUESTIONNAIRE - PHQ9
SUM OF ALL RESPONSES TO PHQ QUESTIONS 1-9: 0
1. LITTLE INTEREST OR PLEASURE IN DOING THINGS: 0
SUM OF ALL RESPONSES TO PHQ QUESTIONS 1-9: 0
SUM OF ALL RESPONSES TO PHQ QUESTIONS 1-9: 0

## 2021-01-19 ASSESSMENT — ENCOUNTER SYMPTOMS
VOICE CHANGE: 1
DIARRHEA: 0
VOMITING: 0
PHOTOPHOBIA: 0
BLOOD IN STOOL: 0
COUGH: 0
SHORTNESS OF BREATH: 0
ABDOMINAL PAIN: 0
NAUSEA: 0
CONSTIPATION: 0
BACK PAIN: 1
SORE THROAT: 0
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
## 2021-07-20 ENCOUNTER — OFFICE VISIT (OUTPATIENT)
Dept: FAMILY MEDICINE CLINIC | Age: 51
End: 2021-07-20
Payer: COMMERCIAL

## 2021-07-20 VITALS
DIASTOLIC BLOOD PRESSURE: 86 MMHG | OXYGEN SATURATION: 98 % | TEMPERATURE: 97.9 F | WEIGHT: 151 LBS | HEIGHT: 71 IN | HEART RATE: 83 BPM | SYSTOLIC BLOOD PRESSURE: 120 MMHG | BODY MASS INDEX: 21.14 KG/M2

## 2021-07-20 DIAGNOSIS — M54.2 CERVICALGIA: ICD-10-CM

## 2021-07-20 DIAGNOSIS — Z12.11 SPECIAL SCREENING FOR MALIGNANT NEOPLASMS, COLON: Primary | ICD-10-CM

## 2021-07-20 DIAGNOSIS — M48.02 CERVICAL SPINAL STENOSIS: ICD-10-CM

## 2021-07-20 DIAGNOSIS — G89.4 CHRONIC PAIN SYNDROME: ICD-10-CM

## 2021-07-20 DIAGNOSIS — F41.9 ANXIETY DISORDER, UNSPECIFIED TYPE: ICD-10-CM

## 2021-07-20 DIAGNOSIS — E78.2 MIXED HYPERLIPIDEMIA: ICD-10-CM

## 2021-07-20 DIAGNOSIS — F17.200 SMOKING: ICD-10-CM

## 2021-07-20 DIAGNOSIS — R49.0 HOARSENESS OF VOICE: ICD-10-CM

## 2021-07-20 DIAGNOSIS — M54.12 CERVICAL RADICULOPATHY: ICD-10-CM

## 2021-07-20 LAB
ALBUMIN SERPL-MCNC: 4.4 G/DL (ref 3.5–5.2)
ALP BLD-CCNC: 53 U/L (ref 40–129)
ALT SERPL-CCNC: 31 U/L (ref 0–40)
ANION GAP SERPL CALCULATED.3IONS-SCNC: 11 MMOL/L (ref 7–16)
AST SERPL-CCNC: 34 U/L (ref 0–39)
BILIRUB SERPL-MCNC: 0.4 MG/DL (ref 0–1.2)
BILIRUBIN DIRECT: <0.2 MG/DL (ref 0–0.3)
BILIRUBIN, INDIRECT: NORMAL MG/DL (ref 0–1)
BUN BLDV-MCNC: 11 MG/DL (ref 6–20)
CALCIUM SERPL-MCNC: 9.3 MG/DL (ref 8.6–10.2)
CHLORIDE BLD-SCNC: 99 MMOL/L (ref 98–107)
CHOLESTEROL, TOTAL: 154 MG/DL (ref 0–199)
CO2: 26 MMOL/L (ref 22–29)
CREAT SERPL-MCNC: 0.9 MG/DL (ref 0.7–1.2)
GFR AFRICAN AMERICAN: >60
GFR NON-AFRICAN AMERICAN: >60 ML/MIN/1.73
GLUCOSE BLD-MCNC: 77 MG/DL (ref 74–99)
HBA1C MFR BLD: 5.3 % (ref 4–5.6)
HDLC SERPL-MCNC: 49 MG/DL
LDL CHOLESTEROL CALCULATED: 84 MG/DL (ref 0–99)
POTASSIUM SERPL-SCNC: 4.7 MMOL/L (ref 3.5–5)
PROSTATE SPECIFIC ANTIGEN: 0.53 NG/ML (ref 0–4)
SODIUM BLD-SCNC: 136 MMOL/L (ref 132–146)
T4 FREE: 1.1 NG/DL (ref 0.93–1.7)
TOTAL PROTEIN: 7.8 G/DL (ref 6.4–8.3)
TRIGL SERPL-MCNC: 104 MG/DL (ref 0–149)
TSH SERPL DL<=0.05 MIU/L-ACNC: 2.52 UIU/ML (ref 0.27–4.2)
URIC ACID, SERUM: 5.5 MG/DL (ref 3.4–7)
VLDLC SERPL CALC-MCNC: 21 MG/DL

## 2021-07-20 PROCEDURE — G8427 DOCREV CUR MEDS BY ELIG CLIN: HCPCS | Performed by: FAMILY MEDICINE

## 2021-07-20 PROCEDURE — 3017F COLORECTAL CA SCREEN DOC REV: CPT | Performed by: FAMILY MEDICINE

## 2021-07-20 PROCEDURE — 4004F PT TOBACCO SCREEN RCVD TLK: CPT | Performed by: FAMILY MEDICINE

## 2021-07-20 PROCEDURE — 99213 OFFICE O/P EST LOW 20 MIN: CPT | Performed by: FAMILY MEDICINE

## 2021-07-20 PROCEDURE — G8420 CALC BMI NORM PARAMETERS: HCPCS | Performed by: FAMILY MEDICINE

## 2021-07-20 RX ORDER — GABAPENTIN 300 MG/1
300 CAPSULE ORAL NIGHTLY
Qty: 90 CAPSULE | Refills: 3 | Status: SHIPPED
Start: 2021-07-20 | End: 2022-03-29 | Stop reason: SDUPTHER

## 2021-07-20 RX ORDER — PREDNISONE 20 MG/1
20 TABLET ORAL 2 TIMES DAILY
Qty: 60 TABLET | Refills: 3 | Status: SHIPPED | OUTPATIENT
Start: 2021-07-20 | End: 2021-08-19

## 2021-07-20 RX ORDER — TIZANIDINE 4 MG/1
2-4 TABLET ORAL EVERY 8 HOURS PRN
Qty: 90 TABLET | Refills: 11 | Status: SHIPPED | OUTPATIENT
Start: 2021-07-20

## 2021-07-20 NOTE — PROGRESS NOTES
Kat Degroot (:  1970) is a 44 y.o. male,Established patient, here for evaluation of the following chief complaint(s):  6 Month Follow-Up         ASSESSMENT/PLAN:  1. Special screening for malignant neoplasms, colon  -     Cologuard (For External Results Only); Future  2. Cervical radiculopathy  -     tiZANidine (ZANAFLEX) 4 MG tablet; Take 0.5-1 tablets by mouth every 8 hours as needed (Neck pain), Disp-90 tablet, R-11Normal  3. Cervical spinal stenosis  -     gabapentin (NEURONTIN) 300 MG capsule; Take 1 capsule by mouth nightly for 30 days. , Disp-90 capsule, R-3Normal  -     predniSONE (DELTASONE) 20 MG tablet; Take 1 tablet by mouth 2 times daily, Disp-60 tablet, R-3Normal  4. Cervicalgia  -     gabapentin (NEURONTIN) 300 MG capsule; Take 1 capsule by mouth nightly for 30 days. , Disp-90 capsule, R-3Normal  -     predniSONE (DELTASONE) 20 MG tablet; Take 1 tablet by mouth 2 times daily, Disp-60 tablet, R-3Normal  5. Chronic pain syndrome  -     gabapentin (NEURONTIN) 300 MG capsule; Take 1 capsule by mouth nightly for 30 days. , Disp-90 capsule, R-3Normal  -     predniSONE (DELTASONE) 20 MG tablet; Take 1 tablet by mouth 2 times daily, Disp-60 tablet, R-3Normal  At this time we will continue with current medication regimen. Patient has had EMG performed. Report shows mild to moderate bilateral carpal tunnel, possible left ulnar entrapment and no sign of cervical motor radiculopathy. Patient has had LANDRY x2 without significant change. Patient would like to treat conservatively with medication and continue to monitor symptoms. No follow-ups on file. Subjective   SUBJECTIVE/OBJECTIVE:  HPI  Patient presents today 6-month follow-up on chronic issues and for medication refills. As noted above patient has already followed with neurosurgery. EMG performed. EMG results as above. Patient had epidural steroid injection x2.   Patient states that these were of no relief whatsoever in regards to his  Cervical spinal stenosis    Cervical radiculopathy    Cervical disc disorder    Chronic pain syndrome    Cervicalgia    Mixed hyperlipidemia     Past Medical History:   Diagnosis Date    Arthritis     GERD (gastroesophageal reflux disease)      Past Surgical History:   Procedure Laterality Date    CARDIAC CATHETERIZATION  8-    Dr. Barb Weems      fusion c5,6,7    NECK SURGERY      fusion    PAIN MANAGEMENT PROCEDURE N/A 12/1/2020    C7-T1 EPIDURAL STEROID INJECTION #1 performed by Agata Comer DO at [place] [address] N/A 1/14/2021    C7 - T1 EPIDURAL STEROID INJECTION  #2 performed by Agata Comer DO at [address] [place] [address] Road History     Socioeconomic History    Marital status:      Spouse name: Not on file    Number of children: Not on file    Years of education: Not on file    Highest education level: Not on file   Occupational History     Employer: [employer]. Tobacco Use    Smoking status: Current Every Day Smoker     Packs/day: 1.00     Years: 30.00     Pack years: 30.00     Types: Cigarettes    Smokeless tobacco: Never Used   Vaping Use    Vaping Use: Never used   Substance and Sexual Activity    Alcohol use: Yes     Comment: WEEKENDS 6 BEERS    Drug use: No    Sexual activity: Not on file   Other Topics Concern    Not on file   Social History Narrative    Not on file     Social Determinants of Health     Financial Resource Strain:     Difficulty of Paying Living Expenses:    Food Insecurity:     Worried About Running Out of Food in the Last Year:     Ran Out of Food in the Last Year:    Transportation Needs:     Lack of Transportation (Medical):      Lack of Transportation (Non-Medical):    Physical Activity:     Days of Exercise per Week:     Minutes of Exercise per Session:    Stress:     Feeling of Stress :    Social Connections:     Frequency of Communication with Friends and Family:     Frequency of Social Gatherings with Friends and Family:     Attends Anglican Services:     Active Member of Clubs or Organizations:     Attends Club or Organization Meetings:     Marital Status:    Intimate Partner Violence:     Fear of Current or Ex-Partner:     Emotionally Abused:     Physically Abused:     Sexually Abused:      Family History   Problem Relation Age of Onset    Diabetes Mother     Cancer Mother     High Blood Pressure Mother     Diabetes Father     High Blood Pressure Father     Kidney Disease Father     High Blood Pressure Sister       Health Maintenance Due   Topic Date Due    Colon cancer screen colonoscopy  Never done     There are no preventive care reminders to display for this patient. There are no preventive care reminders to display for this patient. Health Maintenance Due   Topic    Shingles Vaccine (1 of 2)      Health Maintenance   Topic Date Due    Hepatitis C screen  Never done    Pneumococcal 0-64 years Vaccine (1 of 2 - PPSV23) Never done    HIV screen  Never done    Colon cancer screen colonoscopy  Never done    Shingles Vaccine (1 of 2) Never done    Low dose CT lung screening  Never done    Flu vaccine (1) 09/01/2021    Lipid screen  07/20/2026    DTaP/Tdap/Td vaccine (2 - Td or Tdap) 10/19/2029    COVID-19 Vaccine  Completed    Hepatitis A vaccine  Aged Out    Hepatitis B vaccine  Aged Out    Hib vaccine  Aged Out    Meningococcal (ACWY) vaccine  Aged Out      There are no preventive care reminders to display for this patient. There are no preventive care reminders to display for this patient. /86   Pulse 83   Temp 97.9 °F (36.6 °C) (Temporal)   Ht 5' 11\" (1.803 m)   Wt 151 lb (68.5 kg)   SpO2 98%   BMI 21.06 kg/m²     Objective   Physical Exam  Vitals reviewed. HENT:      Head: Normocephalic and atraumatic. Eyes:      General: No scleral icterus.      Conjunctiva/sclera: Conjunctivae normal.      Pupils: Pupils are equal, round, and reactive to light. Neck:      Thyroid: No thyromegaly. Cardiovascular:      Rate and Rhythm: Normal rate and regular rhythm. Heart sounds: Normal heart sounds. No murmur heard. Pulmonary:      Effort: Pulmonary effort is normal.      Breath sounds: Decreased air movement present. Decreased breath sounds and wheezing present. No rales. Abdominal:      General: Bowel sounds are normal. There is no distension. Palpations: Abdomen is soft. Tenderness: There is no abdominal tenderness. Musculoskeletal:         General: Normal range of motion. Cervical back: Neck supple. Muscular tenderness present. Lymphadenopathy:      Cervical: No cervical adenopathy. Skin:     General: Skin is warm and dry. Findings: No erythema or rash. Neurological:      Mental Status: He is alert and oriented to person, place, and time. Cranial Nerves: No cranial nerve deficit. Sensory: Sensory deficit present. Motor: Weakness present. Psychiatric:         Mood and Affect: Mood normal.         Behavior: Behavior normal.         Thought Content: Thought content normal.         Judgment: Judgment normal.                  An electronic signature was used to authenticate this note.     --Shira Whalen, DO

## 2021-07-21 ENCOUNTER — TELEPHONE (OUTPATIENT)
Dept: FAMILY MEDICINE CLINIC | Age: 51
End: 2021-07-21

## 2021-07-21 LAB
CREATININE URINE: 124 MG/DL (ref 40–278)
HEPATITIS C ANTIBODY INTERPRETATION: NORMAL
MICROALBUMIN UR-MCNC: <12 MG/L
MICROALBUMIN/CREAT UR-RTO: ABNORMAL (ref 0–30)

## 2021-07-21 ASSESSMENT — ENCOUNTER SYMPTOMS
BACK PAIN: 1
VOICE CHANGE: 1
CONSTIPATION: 0
PHOTOPHOBIA: 0
SORE THROAT: 0
SHORTNESS OF BREATH: 0
ABDOMINAL PAIN: 0
BLOOD IN STOOL: 0
VOMITING: 0
DIARRHEA: 0
NAUSEA: 0
COUGH: 0

## 2021-07-21 NOTE — TELEPHONE ENCOUNTER
----- Message from Stevo Del Rio DO sent at 7/21/2021  9:26 AM EDT -----  Please let patient know that Cologuard will be sent to his house for colon cancer screening.

## 2021-08-17 ENCOUNTER — TELEPHONE (OUTPATIENT)
Dept: FAMILY MEDICINE CLINIC | Age: 51
End: 2021-08-17

## 2021-08-17 NOTE — TELEPHONE ENCOUNTER
----- Message from Gomez Puckett DO sent at 8/16/2021  4:50 PM EDT -----  Please check on Cologuard result

## 2021-08-18 NOTE — TELEPHONE ENCOUNTER
Pt states that he did not get the kit. I will refax the order and add a note that it can ship to his home address [place] [address] in [place] if they cannot ship tp a [address].

## 2022-03-29 DIAGNOSIS — M54.2 CERVICALGIA: ICD-10-CM

## 2022-03-29 DIAGNOSIS — M48.02 CERVICAL SPINAL STENOSIS: ICD-10-CM

## 2022-03-29 DIAGNOSIS — G89.4 CHRONIC PAIN SYNDROME: ICD-10-CM

## 2022-03-29 RX ORDER — GABAPENTIN 300 MG/1
300 CAPSULE ORAL NIGHTLY
Qty: 90 CAPSULE | Refills: 3 | Status: SHIPPED | OUTPATIENT
Start: 2022-03-29 | End: 2022-07-19

## 2022-03-29 NOTE — TELEPHONE ENCOUNTER
----- Message from Breezy Kamran sent at 3/29/2022  1:13 PM EDT -----  Subject: Refill Request    QUESTIONS  Name of Medication? gabapentin (NEURONTIN) 300 MG capsule  Patient-reported dosage and instructions? Take 1 capsule by mouth nightly   for 30 days. How many days do you have left? 0  Preferred Pharmacy? [facility] phone number (if available)? [phone]  ---------------------------------------------------------------------------  --------------,  Name of Medication? predniSONE (DELTASONE) 20 MG tablet  Patient-reported dosage and instructions? Take 1 tablet by mouth 2 times   daily  How many days do you have left? 7  Preferred Pharmacy? [facility] phone number (if available)? [phone]  ---------------------------------------------------------------------------  --------------  CALL BACK INFO  What is the best way for the office to contact you? OK to leave message on   voicemail  Preferred Call Back Phone Number?  [phone]

## 2022-03-29 NOTE — TELEPHONE ENCOUNTER
Last Appointment:  7/20/2021  Future Appointments   Date Time Provider Caio Owens   7/19/2022  3:30 PM Danish Whalen,  [address] [place] [address]

## 2022-05-04 RX ORDER — PREDNISONE 20 MG/1
20 TABLET ORAL 2 TIMES DAILY
Qty: 60 TABLET | Refills: 2 | Status: SHIPPED | OUTPATIENT
Start: 2022-05-04 | End: 2022-06-03

## 2022-06-20 ENCOUNTER — OFFICE VISIT (OUTPATIENT)
Dept: FAMILY MEDICINE CLINIC | Age: 52
End: 2022-06-20
Payer: COMMERCIAL

## 2022-06-20 ENCOUNTER — TELEPHONE (OUTPATIENT)
Dept: ORTHOPEDIC SURGERY | Age: 52
End: 2022-06-20

## 2022-06-20 VITALS
DIASTOLIC BLOOD PRESSURE: 84 MMHG | WEIGHT: 153 LBS | SYSTOLIC BLOOD PRESSURE: 138 MMHG | HEIGHT: 71 IN | TEMPERATURE: 97.5 F | OXYGEN SATURATION: 95 % | BODY MASS INDEX: 21.42 KG/M2 | RESPIRATION RATE: 18 BRPM | HEART RATE: 87 BPM

## 2022-06-20 DIAGNOSIS — M24.412 RECURRENT DISLOCATION OF LEFT SHOULDER: ICD-10-CM

## 2022-06-20 DIAGNOSIS — M75.02 ADHESIVE CAPSULITIS OF LEFT SHOULDER: Primary | ICD-10-CM

## 2022-06-20 PROCEDURE — 4004F PT TOBACCO SCREEN RCVD TLK: CPT | Performed by: FAMILY MEDICINE

## 2022-06-20 PROCEDURE — 3017F COLORECTAL CA SCREEN DOC REV: CPT | Performed by: FAMILY MEDICINE

## 2022-06-20 PROCEDURE — G8420 CALC BMI NORM PARAMETERS: HCPCS | Performed by: FAMILY MEDICINE

## 2022-06-20 PROCEDURE — 99213 OFFICE O/P EST LOW 20 MIN: CPT | Performed by: FAMILY MEDICINE

## 2022-06-20 PROCEDURE — G8427 DOCREV CUR MEDS BY ELIG CLIN: HCPCS | Performed by: FAMILY MEDICINE

## 2022-06-20 NOTE — PROGRESS NOTES
OFFICE NOTE    6/20/22  Name: Fabiola Craig  MOL:[id number]   Sex:male   Age:51 y.o. SUBJECTIVE  Chief Complaint   Patient presents with    Shoulder Pain     left shoulder, hurts to do ROM        HPI Dislocated shoulder in his twenties, \"I just gutted it out, never saw anybody for it\"   Works as tool and . 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 Slept on left side and couldn't move it when he got up    Review of Systems   No radicular symptoms. No weakness      Current Outpatient Medications:     gabapentin (NEURONTIN) 300 MG capsule, Take 1 capsule by mouth nightly for 30 days. , Disp: 90 capsule, Rfl: 3    tiZANidine (ZANAFLEX) 4 MG tablet, Take 0.5-1 tablets by mouth every 8 hours as needed (Neck pain), Disp: 90 tablet, Rfl: 11    esomeprazole (NEXIUM) 20 MG delayed release capsule, Take 20 mg by mouth daily , Disp: , Rfl:   No Known Allergies    Past Medical History:   Diagnosis Date    Arthritis     GERD (gastroesophageal reflux disease)      Past Surgical History:   Procedure Laterality Date    CARDIAC CATHETERIZATION  8-    Dr. Demond Hartman      fusion c5,6,7    NECK SURGERY      fusion    PAIN MANAGEMENT PROCEDURE N/A 12/1/2020    C7-T1 EPIDURAL STEROID INJECTION #1 performed by Jim Almaraz DO at [address] [place] [address] N/A 1/14/2021    C7 - T1 EPIDURAL STEROID INJECTION  #2 performed by Jim Almaraz,  at [facility] OR    WISDOM TOOTH EXTRACTION       Family History   Problem Relation Age of Onset    Diabetes Mother     Cancer Mother     High Blood Pressure Mother     Diabetes Father     High Blood Pressure Father     Kidney Disease Father     High Blood Pressure Sister      Social History     Tobacco History     Smoking Status  Current Every Day Smoker Smoking Frequency  1 pack/day for 30 years (30 pk yrs) Smoking Tobacco Type  Cigarettes    Smokeless Tobacco Use  Never Used          Alcohol History     Alcohol Use Status  Yes Comment  WEEKENDS 6 BEERS          Drug Use     Drug Use Status  No          Sexual Activity     Sexually Active  Not Asked                OBJECTIVE  Vitals:    06/20/22 0910   BP: 138/84   Pulse: 87   Resp: 18   Temp: 97.5 °F (36.4 °C)   TempSrc: Temporal   SpO2: 95%   Weight: 153 lb (69.4 kg)   Height: 5' 11\" (1.803 m)        Body mass index is 21.34 kg/m². Orders Placed This Encounter   Procedures    XR SHOULDER LEFT (MIN 2 VIEWS)     Standing Status:   Future     Number of Occurrences:   1     Standing Expiration Date:   6/20/2023    Tiago Bill MD, Orthopaedics, [place]     Referral Priority:   Routine     Referral Type:   Eval and Treat     Referral Reason:   Specialty Services Required     Referred to Provider:   Abi Chavira MD     Requested Specialty:   Orthopedic Surgery     Number of Visits Requested:   1        EXAM   Physical Exam  Vitals and nursing note reviewed. Constitutional:       Appearance: Normal appearance. He is normal weight. Cardiovascular:      Rate and Rhythm: Normal rate and regular rhythm. Pulmonary:      Effort: Pulmonary effort is normal.      Breath sounds: Normal breath sounds. Musculoskeletal:      Comments: Left shoulder \"frozen\" Abducts 30 degrees, internal and external rotation very limited and painful. Humeral head not displaced anteriorly or posteriorly that I could see   Neurological:      Mental Status: He is alert. Madeline Jeffers was seen today for shoulder pain. Diagnoses and all orders for this visit:    Adhesive capsulitis of left shoulder  -     XR SHOULDER LEFT (MIN 2 VIEWS); Future    Recurrent dislocation of left shoulder  -     Eileen Gregg MD, Orthopaedics, [place]    Review of x-ray shows inferior displacement of humerus and advanced OA. Probably needs arthroplasty. Will take FMLA at work and get this tended to. Referral to ortho for more definitive recommendations.  Will take ibuprofen at or under 1200 mg per day for pain as needed.  Codman exercises as able    Return ortho referral.    Electronically signed by Jorge Trimble MD on 6/20/22 at 9:34 AM EDT

## 2022-06-20 NOTE — TELEPHONE ENCOUNTER
Homa Nieto from [facility] [address] (Dr Aaron Mcgee) called to refer patient to Dr Marcin Fair for recurrent left shoulder dislocations / arthritis. BRITTNEY reviewed X-rays, appt given for 6/27/2022 @ 1:30 p.m. Patient notified.

## 2022-06-27 ENCOUNTER — OFFICE VISIT (OUTPATIENT)
Dept: ORTHOPEDIC SURGERY | Age: 52
End: 2022-06-27
Payer: COMMERCIAL

## 2022-06-27 VITALS — BODY MASS INDEX: 21.42 KG/M2 | HEIGHT: 71 IN | WEIGHT: 153 LBS

## 2022-06-27 DIAGNOSIS — M19.012 OSTEOARTHRITIS OF LEFT SHOULDER, UNSPECIFIED OSTEOARTHRITIS TYPE: Primary | ICD-10-CM

## 2022-06-27 DIAGNOSIS — M24.412 RECURRENT DISLOCATION OF LEFT SHOULDER: ICD-10-CM

## 2022-06-27 PROCEDURE — G8427 DOCREV CUR MEDS BY ELIG CLIN: HCPCS | Performed by: ORTHOPAEDIC SURGERY

## 2022-06-27 PROCEDURE — G8420 CALC BMI NORM PARAMETERS: HCPCS | Performed by: ORTHOPAEDIC SURGERY

## 2022-06-27 PROCEDURE — 99204 OFFICE O/P NEW MOD 45 MIN: CPT | Performed by: ORTHOPAEDIC SURGERY

## 2022-06-27 PROCEDURE — 3017F COLORECTAL CA SCREEN DOC REV: CPT | Performed by: ORTHOPAEDIC SURGERY

## 2022-06-27 PROCEDURE — 20610 DRAIN/INJ JOINT/BURSA W/O US: CPT | Performed by: ORTHOPAEDIC SURGERY

## 2022-06-27 PROCEDURE — 4004F PT TOBACCO SCREEN RCVD TLK: CPT | Performed by: ORTHOPAEDIC SURGERY

## 2022-06-27 RX ORDER — LIDOCAINE HYDROCHLORIDE 10 MG/ML
4 INJECTION, SOLUTION INFILTRATION; PERINEURAL ONCE
Status: COMPLETED | OUTPATIENT
Start: 2022-06-27 | End: 2022-06-27

## 2022-06-27 RX ORDER — PREDNISONE 20 MG/1
TABLET ORAL
COMMUNITY
Start: 2022-06-13 | End: 2022-08-30 | Stop reason: SDUPTHER

## 2022-06-27 RX ORDER — TRIAMCINOLONE ACETONIDE 40 MG/ML
40 INJECTION, SUSPENSION INTRA-ARTICULAR; INTRAMUSCULAR ONCE
Status: COMPLETED | OUTPATIENT
Start: 2022-06-27 | End: 2022-06-27

## 2022-06-27 RX ADMIN — LIDOCAINE HYDROCHLORIDE 4 ML: 10 INJECTION, SOLUTION INFILTRATION; PERINEURAL at 14:21

## 2022-06-27 RX ADMIN — TRIAMCINOLONE ACETONIDE 40 MG: 40 INJECTION, SUSPENSION INTRA-ARTICULAR; INTRAMUSCULAR at 14:21

## 2022-06-27 NOTE — PROGRESS NOTES
New Shoulder Patient Visit     Referring Provider:   MD Estee Gay 82,  [address]:   Chief Complaint   Patient presents with    Shoulder Pain     Recurrent dislocation of left shoulder + Adhesive capsulitis of left shoulder - Ref Dr Alexandre Kwok office - Patient states the shoulder \"gets stuck at times and freezes\" , limited and painful ROM    Results     Lt shld XR in Epic        HPI:      Mynor Rivera is a 46y.o. year old male who is seen today  for evaluation of left shoulder pain. Patient reports chronic symptoms ongoing for the last couple years now. Patient states that he has overlap of symptoms and has been seen and treated for chronic neck pain by neurosurgery. Patient states that over the last couple years he has had persistent shoulder pain that has been gradually worsening. He reports limited range of motion. Denies previous treatments to the affected extremity. He reports pain is worse with overhead activities better with rest.  Reports pain at night. Reports occasional grinding of the shoulder. Reports prior injuries have included a shoulder dislocation over 10-15 years ago. Denies formal treatment at time of injury. Reports that over the past week he has had persistent catching of his shoulder that woke him up from sleep. Patient is right-hand dominant. Patient is currently working as a dye setter, he is employed with [employer].     PAST MEDICAL HISTORY  Past Medical History:   Diagnosis Date    Arthritis     GERD (gastroesophageal reflux disease)        PAST SURGICAL HISTORY  Past Surgical History:   Procedure Laterality Date    CARDIAC CATHETERIZATION  8-    Dr. Mil Stern      fusion c5,6,7    NECK SURGERY      fusion    PAIN MANAGEMENT PROCEDURE N/A 12/1/2020    C7-T1 EPIDURAL STEROID INJECTION #1 performed by Edmond Palacio DO at [address] [place] [address] N/A 1/14/2021    C7 - T1 EPIDURAL STEROID INJECTION  #2 performed by Manuela Dance, DO at [facility]    WISDOM TOOTH EXTRACTION         FAMILY HISTORY   Family History   Problem Relation Age of Onset    Diabetes Mother     Cancer Mother     High Blood Pressure Mother     Diabetes Father     High Blood Pressure Father     Kidney Disease Father     High Blood Pressure Sister        SOCIAL HISTORY  Social History     Occupational History     Employer: [employer]. Tobacco Use    Smoking status: Current Every Day Smoker     Packs/day: 1.00     Years: 30.00     Pack years: 30.00     Types: Cigarettes    Smokeless tobacco: Never Used   Vaping Use    Vaping Use: Never used   Substance and Sexual Activity    Alcohol use: Yes     Comment: WEEKENDS 6 BEERS    Drug use: No    Sexual activity: Not on file       CURRENT MEDICATIONS     Current Outpatient Medications:     predniSONE (DELTASONE) 20 MG tablet, take 1 tablet by mouth twice a day, Disp: , Rfl:     gabapentin (NEURONTIN) 300 MG capsule, Take 1 capsule by mouth nightly for 30 days. , Disp: 90 capsule, Rfl: 3    esomeprazole (NEXIUM) 20 MG delayed release capsule, Take 20 mg by mouth daily , Disp: , Rfl:     tiZANidine (ZANAFLEX) 4 MG tablet, Take 0.5-1 tablets by mouth every 8 hours as needed (Neck pain) (Patient not taking: Reported on 6/27/2022), Disp: 90 tablet, Rfl: 11    ALLERGIES  No Known Allergies    Controlled Substances Monitoring:        REVIEW OF SYSTEMS:     Constitutional:  Negative for weight loss, fevers, chills, fatigue  Cardiovascular: Negative for chest pain, palpitations  Pulmonary: Negative for shortness of breath, labored breathing, cough  GI: negative for abdominal pain, nausea, vomitting   MSK: per HPI  Skin: negative for rash, open wounds    All other systems reviewed and are negative           PHYSICAL EXAM     Vitals:    06/27/22 1341   Weight: 153 lb (69.4 kg)   Height: 5' 11\" (1.803 m)       Height: 5' 11\" (1.803 m)  Weight: 153 lb per pt BMI:  Body mass index is 21.34 kg/m². General: The patient is alert and oriented x 3, appears to be stated age and in no distress. HEENT: head is normocephalic, atraumatic. EOMI. Neck: supple, trachea midline, no thyromegaly   Cardiovascular: peripheral pulses palpable. Normal Capillary refill   Respiratory: breathing unlabored, chest expansion symmetric   Skin: no rash, no open wounds, no erythema  Psych: normal affect; mood stable  Neurologic: gait normal, sensation grossly intact in extremities  MSK:    Cervical Spine: There is no tenderness to palpation along the cervical spine. Range of motion is normal.  Spurling's is negative    Shoulder Exam:   Left shoulder exam active range of motion 90/20/iliac crest.  Passive forward elevation improved to about 110 degrees with moderate stiffness present. There is crepitus of the joint with passive range of motion. Belly press test positive for pain and stiffness. Mild weakness and pain with Jobes and speeds tests. Patient had difficulty with Oliver's given limited motion. No swelling deformity visualized. Nontender to palpation    IMAGING:     No new imaging was obtained today. Previous imaging of the left shoulder showing bone-on-bone degenerative changes of the glenohumeral joint    Radiographic findings reviewed with patient    Procedure Note: Shoulder Steroid Injection     The Left shoulder was identified as the injection site. The risk and benefits of a cortisone injection were explained and the patient consented to the injection. Under sterile conditions, the subacromial space was injected from posterior approach with a mixture of 40 mg of Kenalog, 4 cc  1% Lidocaine without complication. A sterile bandage was applied.     Administrations This Visit     lidocaine 1 % injection 4 mL     Admin Date  06/27/2022 Action  Given Dose  4 mL Route  Intra-artICUlar Administered By  Patrick Resendiz RN          triamcinolone acetonide (KENALOG-40) injection 40 mg Admin Date  06/27/2022 Action  Given Dose  40 mg Route  Intra-artICUlar Administered By  Po Bergman RN                ASSESSMENT   Left shoulder osteoarthritis      PLAN  Today we discussed his left shoulder. Patient reports chronic symptoms ongoing over the last couple years now. Recent imaging reviewed showing bone-on-bone degenerative changes to the left shoulder. On exam he does have moderate stiffness loss of motion and crepitus with motion. We discussed treatment options from injections and therapy through joint replacement surgery. Given his young age and occupation as a  recommend beginning conservative treatment now. Patient was agreeable to a cortisone injection today. He will follow-up in 3 months. LUCIO Titus  Orthopedic Surgery   06/27/22  3:59 PM    Staff Addendum    I have seen and evaluated the patient and agree with the assessment and plan as documented by Arnold Clark CNP. I have performed the key components of the history and physical examination and concur with the findings and plan, and have made changes where appropriate/necessary. Agree with above. Also counseled on smoking cessation particularly if he is to undergo surgery.       Kevin Ann MD  [address] [place] [address]

## 2022-06-27 NOTE — LETTER
[address] [facility].  [address] [address]  Phone: [phone]  Fax: [phone]    Samuel Lorenzo MD        June 27, 2022     Patient: Craig Mendez   YOB: 1970   Date of Visit: 6/27/2022       To Whom It May Concern: It is my medical opinion that Juan Person may return to full duty immediately with no restrictions. If you have any questions or concerns, please don't hesitate to call.     Sincerely,        Samuel Lorenzo MD

## 2022-07-19 ENCOUNTER — OFFICE VISIT (OUTPATIENT)
Dept: FAMILY MEDICINE CLINIC | Age: 52
End: 2022-07-19
Payer: COMMERCIAL

## 2022-07-19 VITALS
BODY MASS INDEX: 21.78 KG/M2 | HEIGHT: 71 IN | OXYGEN SATURATION: 97 % | TEMPERATURE: 98.6 F | HEART RATE: 90 BPM | WEIGHT: 155.6 LBS | SYSTOLIC BLOOD PRESSURE: 138 MMHG | DIASTOLIC BLOOD PRESSURE: 76 MMHG

## 2022-07-19 DIAGNOSIS — Z00.00 ENCOUNTER FOR WELL ADULT EXAM WITHOUT ABNORMAL FINDINGS: ICD-10-CM

## 2022-07-19 DIAGNOSIS — Z00.00 ENCOUNTER FOR WELL ADULT EXAM WITHOUT ABNORMAL FINDINGS: Primary | ICD-10-CM

## 2022-07-19 DIAGNOSIS — Z12.12 SCREENING FOR MALIGNANT NEOPLASM OF THE RECTUM: ICD-10-CM

## 2022-07-19 PROBLEM — F10.129 ALCOHOL ABUSE WITH INTOXICATION, UNSPECIFIED (HCC): Status: ACTIVE | Noted: 2017-08-15

## 2022-07-19 LAB
ALBUMIN SERPL-MCNC: 4.4 G/DL (ref 3.5–5.2)
ALP BLD-CCNC: 38 U/L (ref 40–129)
ALT SERPL-CCNC: 19 U/L (ref 0–40)
ANION GAP SERPL CALCULATED.3IONS-SCNC: 13 MMOL/L (ref 7–16)
AST SERPL-CCNC: 18 U/L (ref 0–39)
BACTERIA: NORMAL /HPF
BASOPHILS ABSOLUTE: 0.02 E9/L (ref 0–0.2)
BASOPHILS RELATIVE PERCENT: 0.2 % (ref 0–2)
BILIRUB SERPL-MCNC: 0.3 MG/DL (ref 0–1.2)
BILIRUBIN DIRECT: <0.2 MG/DL (ref 0–0.3)
BILIRUBIN URINE: NEGATIVE
BILIRUBIN, INDIRECT: ABNORMAL MG/DL (ref 0–1)
BLOOD, URINE: NEGATIVE
BUN BLDV-MCNC: 13 MG/DL (ref 6–20)
CALCIUM SERPL-MCNC: 9.1 MG/DL (ref 8.6–10.2)
CHLORIDE BLD-SCNC: 100 MMOL/L (ref 98–107)
CHOLESTEROL, TOTAL: 217 MG/DL (ref 0–199)
CLARITY: CLEAR
CO2: 25 MMOL/L (ref 22–29)
COLOR: YELLOW
CREAT SERPL-MCNC: 0.8 MG/DL (ref 0.7–1.2)
CREATININE URINE: 130 MG/DL (ref 40–278)
EOSINOPHILS ABSOLUTE: 0.08 E9/L (ref 0.05–0.5)
EOSINOPHILS RELATIVE PERCENT: 0.8 % (ref 0–6)
FOLATE: 8.4 NG/ML (ref 4.8–24.2)
GFR AFRICAN AMERICAN: >60
GFR NON-AFRICAN AMERICAN: >60 ML/MIN/1.73
GLUCOSE BLD-MCNC: 86 MG/DL (ref 74–99)
GLUCOSE URINE: NEGATIVE MG/DL
HBA1C MFR BLD: 5.3 % (ref 4–5.6)
HCT VFR BLD CALC: 41 % (ref 37–54)
HDLC SERPL-MCNC: 90 MG/DL
HEMOGLOBIN: 13.7 G/DL (ref 12.5–16.5)
IMMATURE GRANULOCYTES #: 0.05 E9/L
IMMATURE GRANULOCYTES %: 0.5 % (ref 0–5)
KETONES, URINE: NEGATIVE MG/DL
LDL CHOLESTEROL CALCULATED: 105 MG/DL (ref 0–99)
LEUKOCYTE ESTERASE, URINE: NEGATIVE
LYMPHOCYTES ABSOLUTE: 1.69 E9/L (ref 1.5–4)
LYMPHOCYTES RELATIVE PERCENT: 16.6 % (ref 20–42)
MCH RBC QN AUTO: 32.9 PG (ref 26–35)
MCHC RBC AUTO-ENTMCNC: 33.4 % (ref 32–34.5)
MCV RBC AUTO: 98.6 FL (ref 80–99.9)
MICROALBUMIN UR-MCNC: <12 MG/L
MICROALBUMIN/CREAT UR-RTO: ABNORMAL (ref 0–30)
MONOCYTES ABSOLUTE: 1 E9/L (ref 0.1–0.95)
MONOCYTES RELATIVE PERCENT: 9.8 % (ref 2–12)
NEUTROPHILS ABSOLUTE: 7.32 E9/L (ref 1.8–7.3)
NEUTROPHILS RELATIVE PERCENT: 72.1 % (ref 43–80)
NITRITE, URINE: NEGATIVE
PDW BLD-RTO: 15.5 FL (ref 11.5–15)
PH UA: 5.5 (ref 5–9)
PLATELET # BLD: 201 E9/L (ref 130–450)
PMV BLD AUTO: 10.9 FL (ref 7–12)
POTASSIUM SERPL-SCNC: 3.7 MMOL/L (ref 3.5–5)
PROSTATE SPECIFIC ANTIGEN: 0.62 NG/ML (ref 0–4)
PROTEIN UA: NEGATIVE MG/DL
RBC # BLD: 4.16 E12/L (ref 3.8–5.8)
RBC UA: NORMAL /HPF (ref 0–2)
SODIUM BLD-SCNC: 138 MMOL/L (ref 132–146)
SPECIFIC GRAVITY UA: 1.02 (ref 1–1.03)
T4 FREE: 1.2 NG/DL (ref 0.93–1.7)
TOTAL PROTEIN: 7.3 G/DL (ref 6.4–8.3)
TRIGL SERPL-MCNC: 109 MG/DL (ref 0–149)
TSH SERPL DL<=0.05 MIU/L-ACNC: 0.7 UIU/ML (ref 0.27–4.2)
URIC ACID, SERUM: 4.3 MG/DL (ref 3.4–7)
UROBILINOGEN, URINE: 0.2 E.U./DL
VITAMIN B-12: 279 PG/ML (ref 211–946)
VLDLC SERPL CALC-MCNC: 22 MG/DL
WBC # BLD: 10.2 E9/L (ref 4.5–11.5)
WBC UA: NORMAL /HPF (ref 0–5)

## 2022-07-19 PROCEDURE — 99396 PREV VISIT EST AGE 40-64: CPT | Performed by: FAMILY MEDICINE

## 2022-07-19 ASSESSMENT — ENCOUNTER SYMPTOMS
BACK PAIN: 1
PHOTOPHOBIA: 0
BLOOD IN STOOL: 0
SORE THROAT: 0
CONSTIPATION: 0
COUGH: 0
DIARRHEA: 0
NAUSEA: 0
ABDOMINAL PAIN: 0
VOMITING: 0
SHORTNESS OF BREATH: 0

## 2022-07-19 ASSESSMENT — PATIENT HEALTH QUESTIONNAIRE - PHQ9
1. LITTLE INTEREST OR PLEASURE IN DOING THINGS: 0
SUM OF ALL RESPONSES TO PHQ9 QUESTIONS 1 & 2: 0
SUM OF ALL RESPONSES TO PHQ QUESTIONS 1-9: 0
2. FEELING DOWN, DEPRESSED OR HOPELESS: 0

## 2022-07-19 NOTE — PATIENT INSTRUCTIONS
Well Visit, Men 48 to 72: Care Instructions  Overview     Well visits can help you stay healthy. Your doctor has checked your overall health and may have suggested ways to take good care of yourself. Your doctor also may have recommended tests. At home, you can help prevent illness withhealthy eating, regular exercise, and other steps. Follow-up care is a key part of your treatment and safety. Be sure to make and go to all appointments, and call your doctor if you are having problems. It's also a good idea to know your test results and keep alist of the medicines you take. How can you care for yourself at home? Get screening tests that you and your doctor decide on. Screening helps find diseases before any symptoms appear. Eat healthy foods. Choose fruits, vegetables, whole grains, protein, and low-fat dairy foods. Limit fat, especially saturated fat. Reduce salt in your diet. Limit alcohol. Have no more than 2 drinks a day or 14 drinks a week. Get at least 30 minutes of exercise on most days of the week. Walking is a good choice. You also may want to do other activities, such as running, swimming, cycling, or playing tennis or team sports. Reach and stay at a healthy weight. This will lower your risk for many problems, such as obesity, diabetes, heart disease, and high blood pressure. Do not smoke. Smoking can make health problems worse. If you need help quitting, talk to your doctor about stop-smoking programs and medicines. These can increase your chances of quitting for good. Care for your mental health. It is easy to get weighed down by worry and stress. Learn strategies to manage stress, like deep breathing and mindfulness, and stay connected with your family and community. If you find you often feel sad or hopeless, talk with your doctor. Treatment can help. Talk to your doctor about whether you have any risk factors for sexually transmitted infections (STIs).  You can help prevent STIs if you wait to have sex with a new partner (or partners) until you've each been tested for STIs. It also helps if you use condoms (male or female condoms) and if you limit your sex partners to one person who only has sex with you. Vaccines are available for some STIs. If it's important to you to prevent pregnancy with your partner, talk with your doctor about birth control options that might be best for you. If you think you may have a problem with alcohol or drug use, talk to your doctor. This includes prescription medicines (such as amphetamines and opioids) and illegal drugs (such as cocaine and methamphetamine). Your doctor can help you figure out what type of treatment is best for you. Protect your skin from too much sun. When you're outdoors from 10 a.m. to 4 p.m., stay in the shade or cover up with clothing and a hat with a wide brim. Wear sunglasses that block UV rays. Even when it's cloudy, put broad-spectrum sunscreen (SPF 30 or higher) on any exposed skin. See a dentist one or two times a year for checkups and to have your teeth cleaned. Wear a seat belt in the car. When should you call for help? Watch closely for changes in your health, and be sure to contact your doctor if you have any problems or symptoms that concern you. Where can you learn more? Go to [email or web address]. org and sign in to your [employer] account. Enter F932 in the [facility] box to learn more about \"Well Visit, Men 48 to 72: Care Instructions. \"     If you do not have an account, please click on the \"Sign Up Now\" link. Current as of: October 6, 2021               Content Version: 13.3  © 8330-1129 Healthwise, Incorporated. Care instructions adapted under license by [facility] ([facility]). If you have questions about a medical condition or this instruction, always ask your healthcare professional. [address] any warranty or liability for your use of this information.

## 2022-07-19 NOTE — PROGRESS NOTES
Well Adult Note  Name: Greyson Correa Date: 2022   MRN: [id number] Sex: Male   Age: 46 y.o. Ethnicity: Non- / Non    : 1970 Race: White (non-)      Tiffanie Grande is here for well adult exam.  History:  Patient presents today for yearly physical exam.  Continues to take all medications as indicated. Does have slight flareup after stopping the prednisone at present back and shoulder pain. Continues to follow with orthopedic surgery as well for the same. Denies any complaints or concerns at this time. Review of Systems   Constitutional:  Positive for fatigue. Negative for chills and fever. HENT:  Negative for congestion, hearing loss, nosebleeds and sore throat. Eyes:  Negative for photophobia. Respiratory:  Negative for cough and shortness of breath. Cardiovascular:  Negative for chest pain, palpitations and leg swelling. Gastrointestinal:  Negative for abdominal pain, blood in stool, constipation, diarrhea, nausea and vomiting. Endocrine: Negative for polydipsia. Genitourinary:  Negative for dysuria, frequency, hematuria and urgency. Musculoskeletal:  Positive for arthralgias, back pain, myalgias, neck pain and neck stiffness. Skin: Negative. Neurological:  Positive for weakness and numbness. Negative for dizziness, tremors and headaches. Hematological:  Does not bruise/bleed easily. Psychiatric/Behavioral:  Negative for hallucinations and suicidal ideas. All other systems reviewed and are negative. No Known Allergies      Prior to Visit Medications    Medication Sig Taking? Authorizing Provider   predniSONE (DELTASONE) 20 MG tablet take 1 tablet by mouth twice a day Yes Historical Provider, MD   gabapentin (NEURONTIN) 300 MG capsule Take 1 capsule by mouth nightly for 30 days.  Yes Danish Whalen, DO   tiZANidine (ZANAFLEX) 4 MG tablet Take 0.5-1 tablets by mouth every 8 hours as needed (Neck pain) Yes Danish Whalen, DO   esomeprazole ([address]) 20 MG delayed release capsule Take 20 mg by mouth daily  Yes Historical Provider, MD         Past Medical History:   Diagnosis Date    Arthritis     GERD (gastroesophageal reflux disease)        Past Surgical History:   Procedure Laterality Date    CARDIAC CATHETERIZATION  8-    Dr. Zuniga Hirace      fusion c5,6,7    NECK SURGERY      fusion    PAIN MANAGEMENT PROCEDURE N/A 12/1/2020    C7-T1 EPIDURAL STEROID INJECTION #1 performed by Bouchra Page DO at [address] [place] [address] N/A 1/14/2021    C7 - T1 EPIDURAL STEROID INJECTION  #2 performed by Bouchra Page DO at [facility] OR    WISDOM TOOTH EXTRACTION           Family History   Problem Relation Age of Onset    Diabetes Mother     Cancer Mother     High Blood Pressure Mother     Diabetes Father     High Blood Pressure Father     Kidney Disease Father     High Blood Pressure Sister        Social History     Tobacco Use    Smoking status: Every Day     Packs/day: 1.00     Years: 30.00     Pack years: 30.00     Types: Cigarettes    Smokeless tobacco: Never   Vaping Use    Vaping Use: Never used   Substance Use Topics    Alcohol use: Yes     Comment: WEEKENDS 6 BEERS    Drug use: No       Objective   /76 (Site: Right Upper Arm)   Pulse 90   Temp 98.6 °F (37 °C) (Temporal)   Ht 5' 11\" (1.803 m)   Wt 155 lb 9.6 oz (70.6 kg)   SpO2 97%   BMI 21.70 kg/m²   Wt Readings from Last 3 Encounters:   07/19/22 155 lb 9.6 oz (70.6 kg)   06/27/22 153 lb (69.4 kg)   06/20/22 153 lb (69.4 kg)     There were no vitals filed for this visit. Physical Exam  Vitals reviewed. HENT:      Head: Normocephalic and atraumatic. Eyes:      General: No scleral icterus. Conjunctiva/sclera: Conjunctivae normal.      Pupils: Pupils are equal, round, and reactive to light. Neck:      Thyroid: No thyromegaly. Cardiovascular:      Rate and Rhythm: Normal rate and regular rhythm. Heart sounds: Normal heart sounds.  No murmur heard.  Pulmonary:      Effort: Pulmonary effort is normal.      Breath sounds: Decreased air movement present. Decreased breath sounds present. No wheezing or rales. Abdominal:      General: Bowel sounds are normal. There is no distension. Palpations: Abdomen is soft. Tenderness: There is no abdominal tenderness. Musculoskeletal:         General: Normal range of motion. Cervical back: Neck supple. Muscular tenderness present. Lymphadenopathy:      Cervical: No cervical adenopathy. Skin:     General: Skin is warm and dry. Findings: No erythema or rash. Neurological:      Mental Status: He is alert and oriented to person, place, and time. Cranial Nerves: No cranial nerve deficit. Sensory: Sensory deficit present. Motor: Weakness present. Psychiatric:         Mood and Affect: Mood normal.         Behavior: Behavior normal.         Thought Content: Thought content normal.         Judgment: Judgment normal.         Assessment   Plan   1. Encounter for well adult exam without abnormal findings  -     CBC with Auto Differential; Future  -     T4, Free; Future  -     Uric Acid; Future  -     PSA Screening; Future  -     Vitamin B12 & Folate; Future  -     TSH; Future  -     Hepatic Function Panel; Future  -     Basic Metabolic Panel; Future  -     Lipid Panel; Future  -     Hemoglobin A1C; Future  -     Microalbumin / Creatinine Urine Ratio; Future  -     Urinalysis with Microscopic; Future  2.  Screening for malignant neoplasm of the rectum  -     Fecal DNA Colorectal cancer screening (Cologuard)       Personalized Preventive Plan   Current Health Maintenance Status  Immunization History   Administered Date(s) Administered    COVID-19, MODERNA BLUE border, Primary or Immunocompromised, (age 12y+), IM, 100 mcg/0.5mL 03/18/2021, 04/15/2021, 12/18/2021    Influenza, MDCK Quadv, IM, PF (Flucelvax 2 yrs and older) 12/18/2021    Tdap (Boostrix, Adacel) 10/19/2019, 10/19/2019 Health Maintenance   Topic Date Due    Pneumococcal 0-64 years Vaccine (1 - PCV) Never done    HIV screen  Never done    Colorectal Cancer Screen  Never done    Shingles vaccine (1 of 2) Never done    Low dose CT lung screening  Never done    COVID-19 Vaccine (4 - Booster for Moderna series) 04/18/2022    Flu vaccine (1) 09/01/2022    Depression Screen  07/19/2023    Lipids  07/20/2026    DTaP/Tdap/Td vaccine (2 - Td or Tdap) 10/19/2029    Hepatitis C screen  Completed    Hepatitis A vaccine  Aged Out    Hepatitis B vaccine  Aged Out    Hib vaccine  Aged Out    Meningococcal (ACWY) vaccine  Aged Out     Recommendations for Outitude Due: see orders and patient instructions/AVS.    No follow-ups on file.

## 2022-08-16 LAB — NONINV COLON CA DNA+OCC BLD SCRN STL QL: NORMAL

## 2022-08-30 RX ORDER — PREDNISONE 20 MG/1
TABLET ORAL
Qty: 60 TABLET | Refills: 1 | Status: SHIPPED | OUTPATIENT
Start: 2022-08-30

## 2022-08-30 NOTE — TELEPHONE ENCOUNTER
Last Appointment:  7/19/2022  Future Appointments   Date Time Provider Caio [place]   9/30/2022  8:10 AM Freddy Pinto MD [facility]   7/19/2023  3:30 PM Danish Whalen,  [address] [place] [address]

## 2022-09-24 LAB — DIABETIC RETINOPATHY: NEGATIVE

## 2022-09-30 ENCOUNTER — OFFICE VISIT (OUTPATIENT)
Dept: ORTHOPEDIC SURGERY | Age: 52
End: 2022-09-30
Payer: COMMERCIAL

## 2022-09-30 DIAGNOSIS — M19.012 OSTEOARTHRITIS OF LEFT SHOULDER, UNSPECIFIED OSTEOARTHRITIS TYPE: Primary | ICD-10-CM

## 2022-09-30 PROCEDURE — 3017F COLORECTAL CA SCREEN DOC REV: CPT | Performed by: NURSE PRACTITIONER

## 2022-09-30 PROCEDURE — 20610 DRAIN/INJ JOINT/BURSA W/O US: CPT | Performed by: NURSE PRACTITIONER

## 2022-09-30 RX ORDER — BUPIVACAINE HYDROCHLORIDE 2.5 MG/ML
3 INJECTION, SOLUTION INFILTRATION; PERINEURAL ONCE
Status: COMPLETED | OUTPATIENT
Start: 2022-09-30 | End: 2022-09-30

## 2022-09-30 RX ORDER — TRIAMCINOLONE ACETONIDE 40 MG/ML
40 INJECTION, SUSPENSION INTRA-ARTICULAR; INTRAMUSCULAR ONCE
Status: COMPLETED | OUTPATIENT
Start: 2022-09-30 | End: 2022-09-30

## 2022-09-30 RX ORDER — LIDOCAINE HYDROCHLORIDE 10 MG/ML
4 INJECTION, SOLUTION INFILTRATION; PERINEURAL ONCE
Status: CANCELLED | OUTPATIENT
Start: 2022-09-30 | End: 2022-09-30

## 2022-09-30 RX ADMIN — BUPIVACAINE HYDROCHLORIDE 7.5 MG: 2.5 INJECTION, SOLUTION INFILTRATION; PERINEURAL at 11:02

## 2022-09-30 RX ADMIN — TRIAMCINOLONE ACETONIDE 40 MG: 40 INJECTION, SUSPENSION INTRA-ARTICULAR; INTRAMUSCULAR at 11:02

## 2022-09-30 NOTE — PROGRESS NOTES
Follow Up Visit     Ambar Leavitt returns today for follow up visit regarding Left shoulder osteoarthritis. He reports good relief symptoms following previous cortisone injection of the left shoulder. He reports symptoms improved however pain has begun to return and worsen over the last several weeks. He would like to repeat injection today      REVIEW OF SYSTEMS:     Constitutional:  Negative for weight loss, fevers, chills, fatigue  Cardiovascular: Negative for chest pain, palpitations  Pulmonary: Negative for shortness of breath, labored breathing, cough  GI: negative for abdominal pain, nausea, vomitting   MSK: per HPI  Skin: negative for rash, open wounds    All other systems reviewed and are negative       Physical Exam:     No data recorded    General: Alert and oriented x3, no acute distress  Cardiovascular/pulmonary: No labored breathing, peripheral perfusion intact  Musculoskeletal:    Left shoulder exam range of motion 140/50/T10. Pain without weakness on rotator cuff testing. No swelling or deformity visualized. Belly press intact. Controlled Substances Monitoring:      Imaging:  No new imaging obtained today. Previous imaging reviewed showing advanced degenerative changes to the left shoulder glenohumeral joint    Procedure Note: Shoulder Steroid Injection     The Left shoulder was identified as the injection site. The risk and benefits of a cortisone injection were explained and the patient consented to the injection. Under sterile conditions, the subacromial space was injected from posterior approach with a mixture of 40 mg of Kenalog, 3 cc 0.25% Marcaine without complication. A sterile bandage was applied.     Administrations This Visit       bupivacaine (MARCAINE) 0.25 % injection 7.5 mg       Admin Date  09/30/2022 Action  Given Dose  7.5 mg Route  Intra-artICUlar Administered By  Ct Haynes RN              triamcinolone acetonide (KENALOG-40) injection 40 mg       Admin Date  09/30/2022 Action  Given Dose  40 mg Route  Intra-artICUlar Administered By  Tammi Lau RN                      Assessment: Left shoulder osteoarthritis      Plan: Today we discussed his left shoulder. He reports symptoms improved following cortisone injection 3 months ago. He reports recent return of pain over the last several weeks and is requesting an injection today. This was agreed to perform today. He will follow-up in 3 months.         MICHELLE Castro-CNP  Orthopedic Surgery   09/30/22  12:33 PM

## 2023-01-24 DIAGNOSIS — M54.12 CERVICAL RADICULOPATHY: ICD-10-CM

## 2023-01-24 RX ORDER — TIZANIDINE 4 MG/1
2-4 TABLET ORAL EVERY 8 HOURS PRN
Qty: 90 TABLET | Refills: 11 | Status: SHIPPED | OUTPATIENT
Start: 2023-01-24

## 2023-01-24 NOTE — TELEPHONE ENCOUNTER
Last Appointment:  7/19/2022  Future Appointments   Date Time Provider Caio [place]   7/19/2023  3:30 PM Danish Whalen,  [address] [place] [address]

## 2023-04-28 DIAGNOSIS — M54.2 CERVICALGIA: ICD-10-CM

## 2023-04-28 DIAGNOSIS — M48.02 CERVICAL SPINAL STENOSIS: ICD-10-CM

## 2023-04-28 DIAGNOSIS — G89.4 CHRONIC PAIN SYNDROME: ICD-10-CM

## 2023-04-28 RX ORDER — GABAPENTIN 300 MG/1
300 CAPSULE ORAL NIGHTLY
Qty: 90 CAPSULE | Refills: 3 | Status: SHIPPED | OUTPATIENT
Start: 2023-04-28 | End: 2023-05-28

## 2023-05-30 DIAGNOSIS — M54.12 CERVICAL RADICULOPATHY: ICD-10-CM

## 2023-05-30 RX ORDER — TIZANIDINE 4 MG/1
2-4 TABLET ORAL EVERY 8 HOURS PRN
Qty: 90 TABLET | Refills: 11 | Status: SHIPPED
Start: 2023-05-30 | End: 2023-05-31 | Stop reason: SDUPTHER

## 2023-05-30 NOTE — TELEPHONE ENCOUNTER
Last Appointment:  7/19/2022  Future Appointments   Date Time Provider Caio [place]   7/20/2023  3:30 PM DO CANDIDO Correia [facility]

## 2023-05-31 DIAGNOSIS — M54.12 CERVICAL RADICULOPATHY: ICD-10-CM

## 2023-05-31 RX ORDER — TIZANIDINE 4 MG/1
2-4 TABLET ORAL EVERY 8 HOURS PRN
Qty: 90 TABLET | Refills: 5 | Status: SHIPPED | OUTPATIENT
Start: 2023-05-31

## 2023-07-20 ENCOUNTER — OFFICE VISIT (OUTPATIENT)
Dept: PRIMARY CARE CLINIC | Age: 53
End: 2023-07-20
Payer: COMMERCIAL

## 2023-07-20 ENCOUNTER — TELEPHONE (OUTPATIENT)
Dept: PRIMARY CARE CLINIC | Age: 53
End: 2023-07-20

## 2023-07-20 VITALS
TEMPERATURE: 97.6 F | WEIGHT: 163 LBS | DIASTOLIC BLOOD PRESSURE: 78 MMHG | BODY MASS INDEX: 22.82 KG/M2 | SYSTOLIC BLOOD PRESSURE: 138 MMHG | HEART RATE: 96 BPM | OXYGEN SATURATION: 97 % | HEIGHT: 71 IN

## 2023-07-20 DIAGNOSIS — T38.0X5A STEROID INDUCED GLAUCOMA, RIGHT EYE: ICD-10-CM

## 2023-07-20 DIAGNOSIS — M25.512 LEFT SHOULDER PAIN, UNSPECIFIED CHRONICITY: ICD-10-CM

## 2023-07-20 DIAGNOSIS — E78.2 MIXED HYPERLIPIDEMIA: ICD-10-CM

## 2023-07-20 DIAGNOSIS — L30.9 ECZEMA, UNSPECIFIED TYPE: ICD-10-CM

## 2023-07-20 DIAGNOSIS — L30.9 DERMATITIS, UNSPECIFIED: ICD-10-CM

## 2023-07-20 DIAGNOSIS — M54.12 CERVICAL RADICULOPATHY: Primary | ICD-10-CM

## 2023-07-20 DIAGNOSIS — H40.61X0 STEROID INDUCED GLAUCOMA, RIGHT EYE: ICD-10-CM

## 2023-07-20 PROBLEM — F10.129 ALCOHOL ABUSE WITH INTOXICATION, UNSPECIFIED (HCC): Status: RESOLVED | Noted: 2017-08-15 | Resolved: 2023-07-20

## 2023-07-20 LAB
ABSOLUTE IMMATURE GRANULOCYTE: 0.03 K/UL (ref 0–0.58)
ALBUMIN SERPL-MCNC: 4.5 G/DL (ref 3.5–5.2)
ALP BLD-CCNC: 60 U/L (ref 40–129)
ALT SERPL-CCNC: 20 U/L (ref 0–40)
ANION GAP SERPL CALCULATED.3IONS-SCNC: 11 MMOL/L (ref 7–16)
AST SERPL-CCNC: 25 U/L (ref 0–39)
BASOPHILS ABSOLUTE: 0.08 K/UL (ref 0–0.2)
BASOPHILS RELATIVE PERCENT: 1 % (ref 0–2)
BILIRUB SERPL-MCNC: 0.3 MG/DL (ref 0–1.2)
BILIRUBIN DIRECT: <0.2 MG/DL (ref 0–0.3)
BILIRUBIN, INDIRECT: NORMAL MG/DL (ref 0–1)
BUN BLDV-MCNC: 15 MG/DL (ref 6–20)
C-REACTIVE PROTEIN: <3 MG/L (ref 0–5)
CALCIUM SERPL-MCNC: 9.1 MG/DL (ref 8.6–10.2)
CHLORIDE BLD-SCNC: 102 MMOL/L (ref 98–107)
CHOLESTEROL: 193 MG/DL
CO2: 26 MMOL/L (ref 22–29)
CREAT SERPL-MCNC: 1 MG/DL (ref 0.7–1.2)
EOSINOPHILS ABSOLUTE: 0.3 K/UL (ref 0.05–0.5)
EOSINOPHILS RELATIVE PERCENT: 4 % (ref 0–6)
GFR SERPL CREATININE-BSD FRML MDRD: >60 ML/MIN/1.73M2
GLUCOSE BLD-MCNC: 89 MG/DL (ref 74–99)
HCT VFR BLD CALC: 43.5 % (ref 37–54)
HDLC SERPL-MCNC: 40 MG/DL
HEMOGLOBIN: 14.3 G/DL (ref 12.5–16.5)
IMMATURE GRANULOCYTES: 0 % (ref 0–5)
LDL CHOLESTEROL: 108 MG/DL
LYMPHOCYTES ABSOLUTE: 1.75 K/UL (ref 1.5–4)
LYMPHOCYTES RELATIVE PERCENT: 25 % (ref 20–42)
MCH RBC QN AUTO: 32.9 PG (ref 26–35)
MCHC RBC AUTO-ENTMCNC: 32.9 G/DL (ref 32–34.5)
MCV RBC AUTO: 100 FL (ref 80–99.9)
MONOCYTES ABSOLUTE: 0.86 K/UL (ref 0.1–0.95)
MONOCYTES RELATIVE PERCENT: 12 % (ref 2–12)
NEUTROPHILS ABSOLUTE: 3.96 K/UL (ref 1.8–7.3)
NEUTROPHILS RELATIVE PERCENT: 57 % (ref 43–80)
PDW BLD-RTO: 14.7 % (ref 11.5–15)
PLATELET # BLD: 225 K/UL (ref 130–450)
PMV BLD AUTO: 11.5 FL (ref 7–12)
POTASSIUM SERPL-SCNC: 5 MMOL/L (ref 3.5–5)
PROSTATE SPECIFIC ANTIGEN: 0.72 NG/ML (ref 0–4)
RBC # BLD: 4.35 M/UL (ref 3.8–5.8)
SODIUM BLD-SCNC: 139 MMOL/L (ref 132–146)
T4 FREE: 1.1 NG/DL (ref 0.9–1.7)
TOTAL PROTEIN: 7.6 G/DL (ref 6.4–8.3)
TRIGL SERPL-MCNC: 226 MG/DL
TSH SERPL DL<=0.05 MIU/L-ACNC: 1.62 UIU/ML (ref 0.27–4.2)
URIC ACID: 6.2 MG/DL (ref 3.4–7)
VLDLC SERPL CALC-MCNC: 45 MG/DL
WBC # BLD: 7 K/UL (ref 4.5–11.5)

## 2023-07-20 PROCEDURE — G8420 CALC BMI NORM PARAMETERS: HCPCS | Performed by: FAMILY MEDICINE

## 2023-07-20 PROCEDURE — 4004F PT TOBACCO SCREEN RCVD TLK: CPT | Performed by: FAMILY MEDICINE

## 2023-07-20 PROCEDURE — 99214 OFFICE O/P EST MOD 30 MIN: CPT | Performed by: FAMILY MEDICINE

## 2023-07-20 PROCEDURE — 3017F COLORECTAL CA SCREEN DOC REV: CPT | Performed by: FAMILY MEDICINE

## 2023-07-20 PROCEDURE — G8427 DOCREV CUR MEDS BY ELIG CLIN: HCPCS | Performed by: FAMILY MEDICINE

## 2023-07-20 RX ORDER — CEPHALEXIN 500 MG/1
500 CAPSULE ORAL 3 TIMES DAILY
Qty: 21 CAPSULE | Refills: 0 | Status: SHIPPED | OUTPATIENT
Start: 2023-07-20 | End: 2023-07-27

## 2023-07-20 RX ORDER — TRIAMCINOLONE ACETONIDE 5 MG/G
CREAM TOPICAL
Qty: 454 G | Refills: 5 | Status: SHIPPED
Start: 2023-07-20 | End: 2023-07-20

## 2023-07-20 SDOH — ECONOMIC STABILITY: FOOD INSECURITY: WITHIN THE PAST 12 MONTHS, THE FOOD YOU BOUGHT JUST DIDN'T LAST AND YOU DIDN'T HAVE MONEY TO GET MORE.: NEVER TRUE

## 2023-07-20 SDOH — ECONOMIC STABILITY: INCOME INSECURITY: HOW HARD IS IT FOR YOU TO PAY FOR THE VERY BASICS LIKE FOOD, HOUSING, MEDICAL CARE, AND HEATING?: NOT HARD AT ALL

## 2023-07-20 SDOH — ECONOMIC STABILITY: HOUSING INSECURITY
IN THE LAST 12 MONTHS, WAS THERE A TIME WHEN YOU DID NOT HAVE A STEADY PLACE TO SLEEP OR SLEPT IN A SHELTER (INCLUDING NOW)?: NO

## 2023-07-20 SDOH — ECONOMIC STABILITY: FOOD INSECURITY: WITHIN THE PAST 12 MONTHS, YOU WORRIED THAT YOUR FOOD WOULD RUN OUT BEFORE YOU GOT MONEY TO BUY MORE.: NEVER TRUE

## 2023-07-20 ASSESSMENT — PATIENT HEALTH QUESTIONNAIRE - PHQ9
SUM OF ALL RESPONSES TO PHQ QUESTIONS 1-9: 0
2. FEELING DOWN, DEPRESSED OR HOPELESS: 0
1. LITTLE INTEREST OR PLEASURE IN DOING THINGS: 0
SUM OF ALL RESPONSES TO PHQ QUESTIONS 1-9: 0
SUM OF ALL RESPONSES TO PHQ9 QUESTIONS 1 & 2: 0

## 2023-07-20 ASSESSMENT — ENCOUNTER SYMPTOMS
VOMITING: 0
CONSTIPATION: 0
COUGH: 0
DIARRHEA: 0
SHORTNESS OF BREATH: 0
ABDOMINAL PAIN: 0
BACK PAIN: 1
NAUSEA: 0
BLOOD IN STOOL: 0
SORE THROAT: 0
PHOTOPHOBIA: 0

## 2023-07-20 NOTE — PROGRESS NOTES
Subjective   SUBJECTIVE/OBJECTIVE:  HPI  Today for annual physical exam.  As noted above patient had been on long-term steroid for chronic joint issues. 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 Stopped the oral prednisone and has stopped taking glaucoma drops. No current issues with his vision. He has noticed a flare of his eczema since stopping the prednisone however. Used to see a dermatologist and was prescribed topical medications however has not seen them lately. Flareup has lasted for the last several weeks. Continues to have intermittent flareups of left shoulder pain. He had been given intra-articular steroid injections in the past however he wishes to hold off due to high likelihood of return of glaucoma at this time. Patient also has been having issues with sebaceous cysts with most recent 1 rupturing several days ago. Still having some issues with pain and drainage from the left upper back. Denies any other issues or concerns at this time. Review of Systems   Constitutional:  Positive for fatigue. Negative for chills and fever. HENT:  Negative for congestion, hearing loss, nosebleeds and sore throat. Eyes:  Negative for photophobia. Respiratory:  Negative for cough and shortness of breath. Cardiovascular:  Negative for chest pain, palpitations and leg swelling. Gastrointestinal:  Negative for abdominal pain, blood in stool, constipation, diarrhea, nausea and vomiting. Endocrine: Negative for polydipsia. Genitourinary:  Negative for dysuria, frequency, hematuria and urgency. Musculoskeletal:  Positive for arthralgias, back pain, myalgias, neck pain and neck stiffness. Skin:  Positive for wound. Neurological:  Positive for weakness and numbness. Negative for dizziness, tremors and headaches. Hematological:  Does not bruise/bleed easily. Psychiatric/Behavioral:  Negative for hallucinations and suicidal ideas.     All

## 2023-07-20 NOTE — TELEPHONE ENCOUNTER
You prescribed the pt triamcinolone 0.5% cream dispense 454 g, [facility] is calling because the 0.5% only comes in 15 g so she would have to give him 32 tubes to give him 454 g, the 0.1% cream comes in a 454 g so she is asking if the dosing can be switched to the 0.1% instead of the 0.5% cream

## 2023-07-21 LAB
ANTI-NUCLEAR ANTIBODY (ANA): NEGATIVE
BILIRUBIN URINE: NEGATIVE
COLOR: YELLOW
CREATININE URINE: 118.3 MG/DL (ref 40–278)
FOLATE: >20 NG/ML (ref 4.8–24.2)
GLUCOSE URINE: NEGATIVE MG/DL
HBA1C MFR BLD: 5.2 % (ref 4–5.6)
KETONES, URINE: NEGATIVE MG/DL
LEUKOCYTE ESTERASE, URINE: NEGATIVE
MICROALBUMIN/CREAT 24H UR: <12 MG/L (ref 0–19)
MICROALBUMIN/CREAT UR-RTO: NORMAL MCG/MG CREAT (ref 0–30)
NITRITE, URINE: NEGATIVE
PH UA: 7 (ref 5–9)
PROTEIN UA: NEGATIVE MG/DL
RBC UA: NORMAL /HPF
SPECIFIC GRAVITY UA: 1.01 (ref 1–1.03)
TURBIDITY: CLEAR
URINE HGB: NEGATIVE
UROBILINOGEN, URINE: 0.2 EU/DL (ref 0–1)
VITAMIN B-12: 434 PG/ML (ref 211–946)
WBC UA: NORMAL /HPF

## 2024-05-29 DIAGNOSIS — M48.02 CERVICAL SPINAL STENOSIS: ICD-10-CM

## 2024-05-29 DIAGNOSIS — M54.2 CERVICALGIA: ICD-10-CM

## 2024-05-29 DIAGNOSIS — G89.4 CHRONIC PAIN SYNDROME: ICD-10-CM

## 2024-05-29 RX ORDER — GABAPENTIN 300 MG/1
300 CAPSULE ORAL NIGHTLY
Qty: 30 CAPSULE | Refills: 5 | Status: SHIPPED | OUTPATIENT
Start: 2024-05-29 | End: 2024-11-25

## 2025-01-28 ENCOUNTER — HOSPITAL ENCOUNTER (OUTPATIENT)
Dept: CT IMAGING | Age: 55
Discharge: HOME | End: 2025-01-30
Attending: GENERAL ACUTE CARE HOSPITAL
Payer: COMMERCIAL

## 2025-01-28 DIAGNOSIS — M19.012 ARTHRITIS OF LEFT SHOULDER REGION: ICD-10-CM

## 2025-01-28 DIAGNOSIS — M25.512 LEFT SHOULDER PAIN, UNSPECIFIED CHRONICITY: ICD-10-CM

## 2025-01-28 PROCEDURE — 73200 CT UPPER EXTREMITY W/O DYE: CPT

## 2025-02-04 ENCOUNTER — OFFICE VISIT (OUTPATIENT)
Dept: PRIMARY CARE CLINIC | Age: 55
End: 2025-02-04
Payer: COMMERCIAL

## 2025-02-04 VITALS
OXYGEN SATURATION: 98 % | DIASTOLIC BLOOD PRESSURE: 86 MMHG | BODY MASS INDEX: 21.7 KG/M2 | WEIGHT: 155 LBS | HEIGHT: 71 IN | SYSTOLIC BLOOD PRESSURE: 132 MMHG | HEART RATE: 97 BPM

## 2025-02-04 DIAGNOSIS — Z01.818 PREOP EXAMINATION: ICD-10-CM

## 2025-02-04 DIAGNOSIS — Z01.818 PREOP EXAMINATION: Primary | ICD-10-CM

## 2025-02-04 DIAGNOSIS — Z12.5 SPECIAL SCREENING FOR MALIGNANT NEOPLASM OF PROSTATE: ICD-10-CM

## 2025-02-04 PROBLEM — F10.10 ALCOHOL ABUSE, UNCOMPLICATED: Status: RESOLVED | Noted: 2017-08-15 | Resolved: 2025-02-04

## 2025-02-04 LAB
BACTERIA: ABNORMAL
BASOPHILS ABSOLUTE: 0.09 K/UL (ref 0–0.2)
BASOPHILS RELATIVE PERCENT: 1 % (ref 0–2)
BILIRUBIN, URINE: NEGATIVE
COLOR, UA: YELLOW
CREATININE URINE: 83.9 MG/DL (ref 40–278)
EOSINOPHILS ABSOLUTE: 0.17 K/UL (ref 0.05–0.5)
EOSINOPHILS RELATIVE PERCENT: 2 % (ref 0–6)
GLUCOSE URINE: NEGATIVE MG/DL
HCT VFR BLD CALC: 44.8 % (ref 37–54)
HEMOGLOBIN: 15.2 G/DL (ref 12.5–16.5)
IMMATURE GRANULOCYTES %: 0 % (ref 0–5)
IMMATURE GRANULOCYTES ABSOLUTE: <0.03 K/UL (ref 0–0.58)
KETONES, URINE: ABNORMAL MG/DL
LEUKOCYTE ESTERASE, URINE: NEGATIVE
LYMPHOCYTES ABSOLUTE: 1.67 K/UL (ref 1.5–4)
LYMPHOCYTES RELATIVE PERCENT: 22 % (ref 20–42)
MCH RBC QN AUTO: 32.8 PG (ref 26–35)
MCHC RBC AUTO-ENTMCNC: 33.9 G/DL (ref 32–34.5)
MCV RBC AUTO: 96.6 FL (ref 80–99.9)
MICROALBUMIN/CREAT 24H UR: <12 MG/L (ref 0–19)
MICROALBUMIN/CREAT UR-RTO: NORMAL MCG/MG CREAT (ref 0–30)
MONOCYTES ABSOLUTE: 1.06 K/UL (ref 0.1–0.95)
MONOCYTES RELATIVE PERCENT: 14 % (ref 2–12)
NEUTROPHILS ABSOLUTE: 4.55 K/UL (ref 1.8–7.3)
NEUTROPHILS RELATIVE PERCENT: 60 % (ref 43–80)
NITRITE, URINE: NEGATIVE
PDW BLD-RTO: 13.9 % (ref 11.5–15)
PH, URINE: 6 (ref 5–8)
PLATELET # BLD: 246 K/UL (ref 130–450)
PMV BLD AUTO: 10.7 FL (ref 7–12)
PROTEIN UA: NEGATIVE MG/DL
RBC # BLD: 4.64 M/UL (ref 3.8–5.8)
RBC UA: ABNORMAL /HPF
SPECIFIC GRAVITY UA: 1.01 (ref 1–1.03)
TURBIDITY: CLEAR
URINE HGB: NEGATIVE
UROBILINOGEN, URINE: 0.2 EU/DL (ref 0–1)
WBC # BLD: 7.6 K/UL (ref 4.5–11.5)
WBC UA: ABNORMAL /HPF

## 2025-02-04 PROCEDURE — 99213 OFFICE O/P EST LOW 20 MIN: CPT | Performed by: FAMILY MEDICINE

## 2025-02-04 PROCEDURE — 93000 ELECTROCARDIOGRAM COMPLETE: CPT | Performed by: FAMILY MEDICINE

## 2025-02-04 ASSESSMENT — ENCOUNTER SYMPTOMS
BACK PAIN: 0
SORE THROAT: 0
NAUSEA: 0
VOMITING: 0
ABDOMINAL PAIN: 0
PHOTOPHOBIA: 0
CONSTIPATION: 0
SHORTNESS OF BREATH: 0
DIARRHEA: 0
BLOOD IN STOOL: 0
COUGH: 0

## 2025-02-04 ASSESSMENT — PATIENT HEALTH QUESTIONNAIRE - PHQ9
1. LITTLE INTEREST OR PLEASURE IN DOING THINGS: NOT AT ALL
SUM OF ALL RESPONSES TO PHQ9 QUESTIONS 1 & 2: 0
SUM OF ALL RESPONSES TO PHQ QUESTIONS 1-9: 0
2. FEELING DOWN, DEPRESSED OR HOPELESS: NOT AT ALL

## 2025-02-04 NOTE — PROGRESS NOTES
Hong Shah (:  1970) is a 54 y.o. male,Established patient, here for evaluation of the following chief complaint(s):  Pre-op Exam (25 shoulder replacement)         Assessment & Plan  Preop examination  EKG shows no real changes from previous EKG in .  Patient completely asymptomatic.  Baseline labs ordered today.  Further evaluation based on results.    Orders:    EKG 12 Lead    CBC with Auto Differential; Future    T4, Free; Future    Uric Acid; Future    TSH; Future    Hepatic Function Panel; Future    Basic Metabolic Panel; Future    Lipid Panel; Future    Hemoglobin A1C; Future    Urinalysis with Microscopic; Future    Albumin/Creatinine Ratio, Urine; Future    Troponin; Future    CK; Future    Special screening for malignant neoplasm of prostate       Orders:    PSA Screening; Future      No follow-ups on file.       Subjective   HPI  Patient presents today for preoperative clearance for left shoulder replacement by Dr. Fonseca at [facility].  Scheduled for 3/3/2025.  Patient has been feeling well other than his shoulder issue.  No recent chest pain or shortness of breath.  We did discuss the abnormal EKG and patient states that he had similar issue roughly 13 years ago leading to a heart cath which was read as normal.  No current issues or concerns/complaints other than the shoulder issue.      Review of Systems   Constitutional:  Negative for chills and fever.   HENT:  Negative for congestion, hearing loss, nosebleeds and sore throat.    Eyes:  Negative for photophobia.   Respiratory:  Negative for cough and shortness of breath.    Cardiovascular:  Negative for chest pain, palpitations and leg swelling.   Gastrointestinal:  Negative for abdominal pain, blood in stool, constipation, diarrhea, nausea and vomiting.   Endocrine: Negative for polydipsia.   Genitourinary:  Negative for dysuria, frequency, hematuria and urgency.   Musculoskeletal:  Positive for arthralgias and

## 2025-02-05 LAB
ALBUMIN: 4.6 G/DL (ref 3.5–5.2)
ALP BLD-CCNC: 70 U/L (ref 40–129)
ALT SERPL-CCNC: 26 U/L (ref 0–40)
ANION GAP SERPL CALCULATED.3IONS-SCNC: 18 MMOL/L (ref 7–16)
AST SERPL-CCNC: 29 U/L (ref 0–39)
BILIRUB SERPL-MCNC: 0.3 MG/DL (ref 0–1.2)
BILIRUBIN DIRECT: <0.2 MG/DL (ref 0–0.3)
BILIRUBIN, INDIRECT: NORMAL MG/DL (ref 0–1)
BUN BLDV-MCNC: 11 MG/DL (ref 6–20)
CALCIUM SERPL-MCNC: 9.5 MG/DL (ref 8.6–10.2)
CHLORIDE BLD-SCNC: 96 MMOL/L (ref 98–107)
CHOLESTEROL, TOTAL: 160 MG/DL
CO2: 23 MMOL/L (ref 22–29)
CREAT SERPL-MCNC: 0.9 MG/DL (ref 0.7–1.2)
GFR, ESTIMATED: >90 ML/MIN/1.73M2
GLUCOSE BLD-MCNC: 85 MG/DL (ref 74–99)
HBA1C MFR BLD: 5.1 % (ref 4–5.6)
HDLC SERPL-MCNC: 63 MG/DL
LDL CHOLESTEROL: 85 MG/DL
POTASSIUM SERPL-SCNC: 4.5 MMOL/L (ref 3.5–5)
PROSTATE SPECIFIC ANTIGEN: 0.83 NG/ML (ref 0–4)
SODIUM BLD-SCNC: 137 MMOL/L (ref 132–146)
T4 FREE: 1.3 NG/DL (ref 0.9–1.7)
TOTAL CK: 115 U/L (ref 20–200)
TOTAL PROTEIN: 8 G/DL (ref 6.4–8.3)
TRIGL SERPL-MCNC: 59 MG/DL
TROPONIN, HIGH SENSITIVITY: <6 NG/L (ref 0–11)
TSH SERPL DL<=0.05 MIU/L-ACNC: 1.82 UIU/ML (ref 0.27–4.2)
URIC ACID: 4.4 MG/DL (ref 3.4–7)
VLDLC SERPL CALC-MCNC: 12 MG/DL

## 2025-02-24 ENCOUNTER — HOSPITAL ENCOUNTER (OUTPATIENT)
Dept: PREADMISSION TESTING | Age: 55
Discharge: HOME | End: 2025-02-24
Payer: COMMERCIAL

## 2025-02-24 ENCOUNTER — ANESTHESIA EVENT (OUTPATIENT)
Dept: OPERATING ROOM | Age: 55
End: 2025-02-24
Payer: COMMERCIAL

## 2025-02-24 VITALS
OXYGEN SATURATION: 99 % | SYSTOLIC BLOOD PRESSURE: 154 MMHG | TEMPERATURE: 99.6 F | RESPIRATION RATE: 18 BRPM | HEIGHT: 71 IN | WEIGHT: 147 LBS | DIASTOLIC BLOOD PRESSURE: 101 MMHG | BODY MASS INDEX: 20.58 KG/M2 | HEART RATE: 89 BPM

## 2025-02-24 LAB
ANION GAP SERPL CALCULATED.3IONS-SCNC: 11 MMOL/L (ref 7–16)
BUN SERPL-MCNC: 12 MG/DL (ref 6–20)
CALCIUM SERPL-MCNC: 9.3 MG/DL (ref 8.6–10.2)
CHLORIDE SERPL-SCNC: 92 MMOL/L (ref 98–107)
CO2 SERPL-SCNC: 25 MMOL/L (ref 22–29)
CREAT SERPL-MCNC: 0.8 MG/DL (ref 0.7–1.2)
ERYTHROCYTE [DISTWIDTH] IN BLOOD BY AUTOMATED COUNT: 13.2 % (ref 11.5–15)
GFR, ESTIMATED: >90 ML/MIN/1.73M2
GLUCOSE SERPL-MCNC: 100 MG/DL (ref 74–99)
HCT VFR BLD AUTO: 43.3 % (ref 37–54)
HGB BLD-MCNC: 14.7 G/DL (ref 12.5–16.5)
MCH RBC QN AUTO: 32.2 PG (ref 26–35)
MCHC RBC AUTO-ENTMCNC: 33.9 G/DL (ref 32–34.5)
MCV RBC AUTO: 94.7 FL (ref 80–99.9)
PLATELET # BLD AUTO: 252 K/UL (ref 130–450)
PMV BLD AUTO: 10.1 FL (ref 7–12)
POTASSIUM SERPL-SCNC: 4.9 MMOL/L (ref 3.5–5)
RBC # BLD AUTO: 4.57 M/UL (ref 3.8–5.8)
SODIUM SERPL-SCNC: 128 MMOL/L (ref 132–146)
WBC OTHER # BLD: 9.1 K/UL (ref 4.5–11.5)

## 2025-02-24 PROCEDURE — 36415 COLL VENOUS BLD VENIPUNCTURE: CPT

## 2025-02-24 PROCEDURE — 85027 COMPLETE CBC AUTOMATED: CPT

## 2025-02-24 PROCEDURE — 80048 BASIC METABOLIC PNL TOTAL CA: CPT

## 2025-02-24 PROCEDURE — 87081 CULTURE SCREEN ONLY: CPT

## 2025-02-24 RX ORDER — ROPIVACAINE HYDROCHLORIDE 5 MG/ML
30 INJECTION, SOLUTION EPIDURAL; INFILTRATION; PERINEURAL
OUTPATIENT
Start: 2025-02-24 | End: 2025-02-25

## 2025-02-24 RX ORDER — MIDAZOLAM HYDROCHLORIDE 2 MG/2ML
0.5 INJECTION, SOLUTION INTRAMUSCULAR; INTRAVENOUS PRN
OUTPATIENT
Start: 2025-02-24

## 2025-02-24 RX ORDER — FENTANYL CITRATE 50 UG/ML
25 INJECTION, SOLUTION INTRAMUSCULAR; INTRAVENOUS PRN
OUTPATIENT
Start: 2025-02-24

## 2025-02-24 NOTE — PROGRESS NOTES
[facility] PRE-ADMISSION TESTING INSTRUCTIONS    The Preadmission Testing patient is instructed accordingly using the following criteria (check applicable):    ARRIVAL INSTRUCTIONS: arrival time 0745  [x] Parking the day of Surgery is located in the Main Entrance lot.  Upon entering the door, make an immediate right to the surgery reception desk    [x] Bring photo ID and insurance card    [x] Please be sure to arrange for responsible adult to provide transportation to and from the hospital    [x] If you awake am of surgery not feeling well or have temperature >100 please call [phone]    GENERAL INSTRUCTIONS:    [x] Follow instructions for hydration that have been provided to you at your Pre-Admission Visit. Solid food until midnight then clear liquids. No gum, candy or mints.    [x] You may brush your teeth    [x] Take medications as instructed   Nexium    [x] Stop herbal supplements and vitamins 5 days prior to procedure    [x] Follow preop dosing of blood thinners per physician instructions    [x] No tobacco products within 24 hours of surgery     [x] No alcohol or illegal drug use within 24 hours of surgery.    [x] Jewelry, body piercing's, eyeglasses, contact lenses and dentures are not permitted into surgery (bring cases)      [x] Please do not wear any nail polish, make up or hair products on the day of surgery    [x] You can expect a call the business day prior to procedure to notify you if your arrival time changes    [x] If you receive a survey after surgery we would greatly appreciate your comments    [x] Please notify surgeon if you develop any illness between now and time of surgery (cold, cough, sore throat, fever, nausea, vomiting) or any signs of infections  including skin, wounds, and dental.    [x]  The Outpatient Pharmacy is available to fill your prescription here on your day of surgery, ask your preop nurse for details    [] Other instructions    EDUCATIONAL

## 2025-02-24 NOTE — DISCHARGE INSTRUCTIONS
[facility] PRE-ADMISSION TESTING INSTRUCTIONS    The Preadmission Testing patient is instructed accordingly using the following criteria (check applicable):    ARRIVAL INSTRUCTIONS: arrival time 0745  [x] Parking the day of Surgery is located in the Main Entrance lot.  Upon entering the door, make an immediate right to the surgery reception desk    [x] Bring photo ID and insurance card    [x] Please be sure to arrange for responsible adult to provide transportation to and from the hospital    [x] If you awake am of surgery not feeling well or have temperature >100 please call [phone]    GENERAL INSTRUCTIONS:    [x] Follow instructions for hydration that have been provided to you at your Pre-Admission Visit. Solid food until midnight then clear liquids. No gum, candy or mints.    [x] You may brush your teeth    [x] Take medications as instructed   Nexium    [x] Stop herbal supplements and vitamins 5 days prior to procedure    [x] Follow preop dosing of blood thinners per physician instructions    [x] No tobacco products within 24 hours of surgery     [x] No alcohol or illegal drug use within 24 hours of surgery.    [x] Jewelry, body piercing's, eyeglasses, contact lenses and dentures are not permitted into surgery (bring cases)      [x] Please do not wear any nail polish, make up or hair products on the day of surgery    [x] You can expect a call the business day prior to procedure to notify you if your arrival time changes    [x] If you receive a survey after surgery we would greatly appreciate your comments    [x] Please notify surgeon if you develop any illness between now and time of surgery (cold, cough, sore throat, fever, nausea, vomiting) or any signs of infections  including skin, wounds, and dental.    [x]  The Outpatient Pharmacy is available to fill your prescription here on your day of surgery, ask your preop nurse for details    [] Other instructions    EDUCATIONAL

## 2025-02-24 NOTE — ANESTHESIA PRE PROCEDURE
note.     --Danish Whalen DO         Electronically signed by Danish Whalen DO at 2/4/2025  4:39 PM       Neuro/Psych:   (+) neuromuscular disease:, psychiatric history:             ROS comment: Anxiety disorder  Cervical disc disorder  Cervical radiculopathy  Cervical spinal stenosis  Cervicalgia  Chronic pain syndrome  Left shoulder pain  Steroid induced glaucoma   GI/Hepatic/Renal:   (+) GERD: no interval change, liver disease (EtOH abuse):          Endo/Other:    (+) : arthritis (History of neck and spine surgery): OA., electrolyte abnormalities (Hyponatremia (Na+ 128 mmol/L)).          Pt had no PAT visit        ROS comment: Eczema  Dermatitis Abdominal:             Vascular: negative vascular ROS.         Other Findings:             Anesthesia Plan      general and regional     ASA 3     (Discussed with pt. [facility] of Lt. ISNB for postop pain and pt. Agrees to this.)  Induction: intravenous.    MIPS: Postoperative opioids intended and Prophylactic antiemetics administered.  Anesthetic plan and risks discussed with patient.      Plan discussed with CRNA.          Post-op pain plan if not by surgeon: single peripheral nerve block            Michael Pritchett DO   2/24/2025    History, data, and pertinent studies from chart review.  Above represents information available via the shared medical record including previous anesthetic, medication, and allergy history.  Confirmation of above and final disposition per DOS anesthesiologist.    PAT notified to repeat BMP DOS.  PAT advised to notify either PCP or Dr. Fonseca concerning hyponatremia.    Michael Pritchett DO  Staff Anesthesiologist  February 24, 2025  3:32 PM     Chart Review:  Chart was reviewed on March 2, 2025 at 7:50 PM by Gerber Og DO.  (Final assessment and plan on day of surgery)    Patient seen and examined, chart reviewed, agree with above findings.  Anesthetic plan, risks, benefits, alternatives, and personnel involved discussed

## 2025-02-25 LAB
MICROORGANISM SPEC CULT: NORMAL
SPECIMEN DESCRIPTION: NORMAL

## 2025-02-25 NOTE — PROGRESS NOTES
Dr. Pritchett requesting BMP DOS to recheck sodium level, also that Berdis or PCP be notified of sodium level. Updated Wicho AGUILAR.

## 2025-03-03 ENCOUNTER — HOSPITAL ENCOUNTER (INPATIENT)
Age: 55
LOS: 2 days | Discharge: HOME | DRG: 483 | End: 2025-03-07
Attending: GENERAL ACUTE CARE HOSPITAL | Admitting: GENERAL ACUTE CARE HOSPITAL
Payer: COMMERCIAL

## 2025-03-03 ENCOUNTER — ANESTHESIA (OUTPATIENT)
Dept: OPERATING ROOM | Age: 55
End: 2025-03-03
Payer: COMMERCIAL

## 2025-03-03 ENCOUNTER — APPOINTMENT (OUTPATIENT)
Dept: GENERAL RADIOLOGY | Age: 55
DRG: 483 | End: 2025-03-03
Attending: GENERAL ACUTE CARE HOSPITAL
Payer: COMMERCIAL

## 2025-03-03 DIAGNOSIS — M19.012 ARTHRITIS OF LEFT SHOULDER REGION: ICD-10-CM

## 2025-03-03 DIAGNOSIS — E87.1 HYPONATREMIA: ICD-10-CM

## 2025-03-03 DIAGNOSIS — J95.811 POSTPROCEDURAL PNEUMOTHORAX: Primary | ICD-10-CM

## 2025-03-03 LAB
ANION GAP SERPL CALCULATED.3IONS-SCNC: 14 MMOL/L (ref 7–16)
BUN SERPL-MCNC: 8 MG/DL (ref 6–20)
CALCIUM SERPL-MCNC: 9.6 MG/DL (ref 8.6–10.2)
CHLORIDE SERPL-SCNC: 93 MMOL/L (ref 98–107)
CO2 SERPL-SCNC: 22 MMOL/L (ref 22–29)
CREAT SERPL-MCNC: 0.8 MG/DL (ref 0.7–1.2)
GFR, ESTIMATED: >90 ML/MIN/1.73M2
GLUCOSE SERPL-MCNC: 101 MG/DL (ref 74–99)
POTASSIUM SERPL-SCNC: 5 MMOL/L (ref 3.5–5)
SODIUM SERPL-SCNC: 129 MMOL/L (ref 132–146)

## 2025-03-03 PROCEDURE — 2500000003 HC RX 250 WO HCPCS: Performed by: GENERAL ACUTE CARE HOSPITAL

## 2025-03-03 PROCEDURE — C1713 ANCHOR/SCREW BN/BN,TIS/BN: HCPCS | Performed by: GENERAL ACUTE CARE HOSPITAL

## 2025-03-03 PROCEDURE — 64415 NJX AA&/STRD BRCH PLXS IMG: CPT | Performed by: ANESTHESIOLOGY

## 2025-03-03 PROCEDURE — L3650 SO 8 ABD RESTRAINT PRE OTS: HCPCS | Performed by: GENERAL ACUTE CARE HOSPITAL

## 2025-03-03 PROCEDURE — 88311 DECALCIFY TISSUE: CPT

## 2025-03-03 PROCEDURE — 2580000003 HC RX 258: Performed by: GENERAL ACUTE CARE HOSPITAL

## 2025-03-03 PROCEDURE — 88304 TISSUE EXAM BY PATHOLOGIST: CPT

## 2025-03-03 PROCEDURE — 3600000014 HC SURGERY LEVEL 4 ADDTL 15MIN: Performed by: GENERAL ACUTE CARE HOSPITAL

## 2025-03-03 PROCEDURE — 2700000000 HC OXYGEN THERAPY PER DAY

## 2025-03-03 PROCEDURE — 2720000010 HC SURG SUPPLY STERILE: Performed by: GENERAL ACUTE CARE HOSPITAL

## 2025-03-03 PROCEDURE — 6360000002 HC RX W HCPCS: Performed by: STUDENT IN AN ORGANIZED HEALTH CARE EDUCATION/TRAINING PROGRAM

## 2025-03-03 PROCEDURE — 6360000002 HC RX W HCPCS: Performed by: NURSE ANESTHETIST, CERTIFIED REGISTERED

## 2025-03-03 PROCEDURE — G0378 HOSPITAL OBSERVATION PER HR: HCPCS

## 2025-03-03 PROCEDURE — 0W9B30Z DRAINAGE OF LEFT PLEURAL CAVITY WITH DRAINAGE DEVICE, PERCUTANEOUS APPROACH: ICD-10-PCS | Performed by: GENERAL ACUTE CARE HOSPITAL

## 2025-03-03 PROCEDURE — 6360000002 HC RX W HCPCS: Performed by: ANESTHESIOLOGY

## 2025-03-03 PROCEDURE — 6360000002 HC RX W HCPCS: Performed by: GENERAL ACUTE CARE HOSPITAL

## 2025-03-03 PROCEDURE — 2500000003 HC RX 250 WO HCPCS: Performed by: NURSE ANESTHETIST, CERTIFIED REGISTERED

## 2025-03-03 PROCEDURE — C1776 JOINT DEVICE (IMPLANTABLE): HCPCS | Performed by: GENERAL ACUTE CARE HOSPITAL

## 2025-03-03 PROCEDURE — 2709999900 HC NON-CHARGEABLE SUPPLY: Performed by: GENERAL ACUTE CARE HOSPITAL

## 2025-03-03 PROCEDURE — 7100000000 HC PACU RECOVERY - FIRST 15 MIN: Performed by: GENERAL ACUTE CARE HOSPITAL

## 2025-03-03 PROCEDURE — 6370000000 HC RX 637 (ALT 250 FOR IP): Performed by: GENERAL ACUTE CARE HOSPITAL

## 2025-03-03 PROCEDURE — 73030 X-RAY EXAM OF SHOULDER: CPT

## 2025-03-03 PROCEDURE — 3700000001 HC ADD 15 MINUTES (ANESTHESIA): Performed by: GENERAL ACUTE CARE HOSPITAL

## 2025-03-03 PROCEDURE — 3600000004 HC SURGERY LEVEL 4 BASE: Performed by: GENERAL ACUTE CARE HOSPITAL

## 2025-03-03 PROCEDURE — 6370000000 HC RX 637 (ALT 250 FOR IP): Performed by: ORTHOPAEDIC SURGERY

## 2025-03-03 PROCEDURE — 3700000000 HC ANESTHESIA ATTENDED CARE: Performed by: GENERAL ACUTE CARE HOSPITAL

## 2025-03-03 PROCEDURE — 71045 X-RAY EXAM CHEST 1 VIEW: CPT

## 2025-03-03 PROCEDURE — 0RRK0JZ REPLACEMENT OF LEFT SHOULDER JOINT WITH SYNTHETIC SUBSTITUTE, OPEN APPROACH: ICD-10-PCS | Performed by: GENERAL ACUTE CARE HOSPITAL

## 2025-03-03 PROCEDURE — 7100000001 HC PACU RECOVERY - ADDTL 15 MIN: Performed by: GENERAL ACUTE CARE HOSPITAL

## 2025-03-03 PROCEDURE — 80048 BASIC METABOLIC PNL TOTAL CA: CPT

## 2025-03-03 DEVICE — CEMENT BONE CONFLOW G: Type: IMPLANTABLE DEVICE | Site: SHOULDER | Status: FUNCTIONAL

## 2025-03-03 DEVICE — IMPLANTABLE DEVICE
Type: IMPLANTABLE DEVICE | Site: SHOULDER | Status: FUNCTIONAL
Brand: ALLIANCE™ TRABECULAR METAL™

## 2025-03-03 DEVICE — GLENOID SHLDR FOSSA PROSTHESIS PREFAB 4 PEG: Type: IMPLANTABLE DEVICE | Site: SHOULDER | Status: FUNCTIONAL

## 2025-03-03 DEVICE — IMPLANTABLE DEVICE: Type: IMPLANTABLE DEVICE | Site: SHOULDER | Status: FUNCTIONAL

## 2025-03-03 RX ORDER — SODIUM CHLORIDE 0.9 % (FLUSH) 0.9 %
5-40 SYRINGE (ML) INJECTION PRN
Status: DISCONTINUED | OUTPATIENT
Start: 2025-03-03 | End: 2025-03-03 | Stop reason: HOSPADM

## 2025-03-03 RX ORDER — PANTOPRAZOLE SODIUM 40 MG/1
40 TABLET, DELAYED RELEASE ORAL
Status: DISCONTINUED | OUTPATIENT
Start: 2025-03-04 | End: 2025-03-07 | Stop reason: HOSPADM

## 2025-03-03 RX ORDER — SODIUM CHLORIDE 0.9 % (FLUSH) 0.9 %
5-40 SYRINGE (ML) INJECTION EVERY 12 HOURS SCHEDULED
Status: DISCONTINUED | OUTPATIENT
Start: 2025-03-03 | End: 2025-03-07 | Stop reason: HOSPADM

## 2025-03-03 RX ORDER — ROCURONIUM BROMIDE 10 MG/ML
INJECTION, SOLUTION INTRAVENOUS
Status: DISCONTINUED | OUTPATIENT
Start: 2025-03-03 | End: 2025-03-03 | Stop reason: SDUPTHER

## 2025-03-03 RX ORDER — ASPIRIN 81 MG/1
81 TABLET, CHEWABLE ORAL 2 TIMES DAILY
Status: DISCONTINUED | OUTPATIENT
Start: 2025-03-03 | End: 2025-03-07 | Stop reason: HOSPADM

## 2025-03-03 RX ORDER — PROPOFOL 10 MG/ML
INJECTION, EMULSION INTRAVENOUS
Status: DISCONTINUED | OUTPATIENT
Start: 2025-03-03 | End: 2025-03-03 | Stop reason: SDUPTHER

## 2025-03-03 RX ORDER — SUCCINYLCHOLINE/SOD CL,ISO/PF 200MG/10ML
SYRINGE (ML) INTRAVENOUS
Status: DISCONTINUED | OUTPATIENT
Start: 2025-03-03 | End: 2025-03-03 | Stop reason: SDUPTHER

## 2025-03-03 RX ORDER — ROPIVACAINE HYDROCHLORIDE 5 MG/ML
30 INJECTION, SOLUTION EPIDURAL; INFILTRATION; PERINEURAL
Status: DISCONTINUED | OUTPATIENT
Start: 2025-03-03 | End: 2025-03-03 | Stop reason: HOSPADM

## 2025-03-03 RX ORDER — TRANEXAMIC ACID 10 MG/ML
1000 INJECTION, SOLUTION INTRAVENOUS
Status: DISCONTINUED | OUTPATIENT
Start: 2025-03-03 | End: 2025-03-03 | Stop reason: HOSPADM

## 2025-03-03 RX ORDER — MORPHINE SULFATE 4 MG/ML
4 INJECTION, SOLUTION INTRAMUSCULAR; INTRAVENOUS
Status: DISCONTINUED | OUTPATIENT
Start: 2025-03-03 | End: 2025-03-07 | Stop reason: HOSPADM

## 2025-03-03 RX ORDER — ONDANSETRON 2 MG/ML
4 INJECTION INTRAMUSCULAR; INTRAVENOUS
Status: DISCONTINUED | OUTPATIENT
Start: 2025-03-03 | End: 2025-03-03 | Stop reason: HOSPADM

## 2025-03-03 RX ORDER — ROPIVACAINE HYDROCHLORIDE 5 MG/ML
INJECTION, SOLUTION EPIDURAL; INFILTRATION; PERINEURAL
Status: COMPLETED | OUTPATIENT
Start: 2025-03-03 | End: 2025-03-03

## 2025-03-03 RX ORDER — ONDANSETRON 2 MG/ML
4 INJECTION INTRAMUSCULAR; INTRAVENOUS EVERY 6 HOURS PRN
Status: DISCONTINUED | OUTPATIENT
Start: 2025-03-03 | End: 2025-03-07 | Stop reason: HOSPADM

## 2025-03-03 RX ORDER — FENTANYL CITRATE 50 UG/ML
INJECTION, SOLUTION INTRAMUSCULAR; INTRAVENOUS
Status: DISCONTINUED | OUTPATIENT
Start: 2025-03-03 | End: 2025-03-03 | Stop reason: SDUPTHER

## 2025-03-03 RX ORDER — DEXAMETHASONE SODIUM PHOSPHATE 4 MG/ML
INJECTION, SOLUTION INTRA-ARTICULAR; INTRALESIONAL; INTRAMUSCULAR; INTRAVENOUS; SOFT TISSUE
Status: DISCONTINUED | OUTPATIENT
Start: 2025-03-03 | End: 2025-03-03 | Stop reason: SDUPTHER

## 2025-03-03 RX ORDER — TRANEXAMIC ACID 10 MG/ML
1000 INJECTION, SOLUTION INTRAVENOUS
Status: COMPLETED | OUTPATIENT
Start: 2025-03-03 | End: 2025-03-03

## 2025-03-03 RX ORDER — ONDANSETRON 2 MG/ML
INJECTION INTRAMUSCULAR; INTRAVENOUS
Status: DISCONTINUED | OUTPATIENT
Start: 2025-03-03 | End: 2025-03-03 | Stop reason: SDUPTHER

## 2025-03-03 RX ORDER — MIDAZOLAM HYDROCHLORIDE 1 MG/ML
INJECTION, SOLUTION INTRAMUSCULAR; INTRAVENOUS
Status: DISCONTINUED | OUTPATIENT
Start: 2025-03-03 | End: 2025-03-03 | Stop reason: SDUPTHER

## 2025-03-03 RX ORDER — VANCOMYCIN HYDROCHLORIDE 1 G/20ML
INJECTION, POWDER, LYOPHILIZED, FOR SOLUTION INTRAVENOUS PRN
Status: DISCONTINUED | OUTPATIENT
Start: 2025-03-03 | End: 2025-03-03 | Stop reason: ALTCHOICE

## 2025-03-03 RX ORDER — LIDOCAINE HYDROCHLORIDE 20 MG/ML
INJECTION, SOLUTION EPIDURAL; INFILTRATION; INTRACAUDAL; PERINEURAL
Status: DISCONTINUED | OUTPATIENT
Start: 2025-03-03 | End: 2025-03-03 | Stop reason: SDUPTHER

## 2025-03-03 RX ORDER — MIDAZOLAM HYDROCHLORIDE 2 MG/2ML
2 INJECTION, SOLUTION INTRAMUSCULAR; INTRAVENOUS ONCE
Status: COMPLETED | OUTPATIENT
Start: 2025-03-03 | End: 2025-03-03

## 2025-03-03 RX ORDER — FENTANYL CITRATE 50 UG/ML
25 INJECTION, SOLUTION INTRAMUSCULAR; INTRAVENOUS PRN
Status: DISCONTINUED | OUTPATIENT
Start: 2025-03-03 | End: 2025-03-03 | Stop reason: HOSPADM

## 2025-03-03 RX ORDER — SODIUM CHLORIDE 0.9 % (FLUSH) 0.9 %
5-40 SYRINGE (ML) INJECTION EVERY 12 HOURS SCHEDULED
Status: DISCONTINUED | OUTPATIENT
Start: 2025-03-03 | End: 2025-03-03 | Stop reason: HOSPADM

## 2025-03-03 RX ORDER — SODIUM CHLORIDE 9 MG/ML
INJECTION, SOLUTION INTRAVENOUS PRN
Status: DISCONTINUED | OUTPATIENT
Start: 2025-03-03 | End: 2025-03-07 | Stop reason: HOSPADM

## 2025-03-03 RX ORDER — MIDAZOLAM HYDROCHLORIDE 2 MG/2ML
0.5 INJECTION, SOLUTION INTRAMUSCULAR; INTRAVENOUS PRN
Status: DISCONTINUED | OUTPATIENT
Start: 2025-03-03 | End: 2025-03-03 | Stop reason: HOSPADM

## 2025-03-03 RX ORDER — MORPHINE SULFATE 2 MG/ML
2 INJECTION, SOLUTION INTRAMUSCULAR; INTRAVENOUS
Status: DISCONTINUED | OUTPATIENT
Start: 2025-03-03 | End: 2025-03-07 | Stop reason: HOSPADM

## 2025-03-03 RX ORDER — SODIUM CHLORIDE 9 MG/ML
INJECTION, SOLUTION INTRAVENOUS PRN
Status: DISCONTINUED | OUTPATIENT
Start: 2025-03-03 | End: 2025-03-03 | Stop reason: HOSPADM

## 2025-03-03 RX ORDER — NALOXONE HYDROCHLORIDE 0.4 MG/ML
INJECTION, SOLUTION INTRAMUSCULAR; INTRAVENOUS; SUBCUTANEOUS PRN
Status: DISCONTINUED | OUTPATIENT
Start: 2025-03-03 | End: 2025-03-03 | Stop reason: HOSPADM

## 2025-03-03 RX ORDER — SODIUM CHLORIDE 9 MG/ML
INJECTION, SOLUTION INTRAVENOUS CONTINUOUS
Status: DISCONTINUED | OUTPATIENT
Start: 2025-03-03 | End: 2025-03-07 | Stop reason: HOSPADM

## 2025-03-03 RX ORDER — OXYCODONE HYDROCHLORIDE 5 MG/1
10 TABLET ORAL EVERY 4 HOURS PRN
Status: DISCONTINUED | OUTPATIENT
Start: 2025-03-03 | End: 2025-03-07 | Stop reason: HOSPADM

## 2025-03-03 RX ORDER — NICOTINE 21 MG/24HR
1 PATCH, TRANSDERMAL 24 HOURS TRANSDERMAL DAILY
Status: DISCONTINUED | OUTPATIENT
Start: 2025-03-03 | End: 2025-03-07 | Stop reason: HOSPADM

## 2025-03-03 RX ORDER — FENTANYL CITRATE 50 UG/ML
100 INJECTION, SOLUTION INTRAMUSCULAR; INTRAVENOUS
Status: DISCONTINUED | OUTPATIENT
Start: 2025-03-04 | End: 2025-03-03

## 2025-03-03 RX ORDER — ONDANSETRON 4 MG/1
4 TABLET, ORALLY DISINTEGRATING ORAL EVERY 8 HOURS PRN
Status: DISCONTINUED | OUTPATIENT
Start: 2025-03-03 | End: 2025-03-07 | Stop reason: HOSPADM

## 2025-03-03 RX ORDER — TRANEXAMIC ACID 10 MG/ML
1000 INJECTION, SOLUTION INTRAVENOUS ONCE
Status: COMPLETED | OUTPATIENT
Start: 2025-03-03 | End: 2025-03-03

## 2025-03-03 RX ORDER — OXYCODONE HYDROCHLORIDE 5 MG/1
5 TABLET ORAL EVERY 4 HOURS PRN
Status: DISCONTINUED | OUTPATIENT
Start: 2025-03-03 | End: 2025-03-07 | Stop reason: HOSPADM

## 2025-03-03 RX ORDER — EPINEPHRINE 1 MG/ML
INJECTION, SOLUTION, CONCENTRATE INTRAVENOUS PRN
Status: DISCONTINUED | OUTPATIENT
Start: 2025-03-03 | End: 2025-03-03 | Stop reason: ALTCHOICE

## 2025-03-03 RX ORDER — FENTANYL CITRATE 50 UG/ML
100 INJECTION, SOLUTION INTRAMUSCULAR; INTRAVENOUS ONCE
Status: COMPLETED | OUTPATIENT
Start: 2025-03-03 | End: 2025-03-03

## 2025-03-03 RX ORDER — SODIUM CHLORIDE 0.9 % (FLUSH) 0.9 %
5-40 SYRINGE (ML) INJECTION PRN
Status: DISCONTINUED | OUTPATIENT
Start: 2025-03-03 | End: 2025-03-07 | Stop reason: HOSPADM

## 2025-03-03 RX ADMIN — TRANEXAMIC ACID 1000 MG: 10 INJECTION, SOLUTION INTRAVENOUS at 08:16

## 2025-03-03 RX ADMIN — PROPOFOL 200 MG: 10 INJECTION, EMULSION INTRAVENOUS at 08:11

## 2025-03-03 RX ADMIN — WATER 2000 MG: 1 INJECTION INTRAMUSCULAR; INTRAVENOUS; SUBCUTANEOUS at 08:14

## 2025-03-03 RX ADMIN — FENTANYL CITRATE 50 MCG: 50 INJECTION, SOLUTION INTRAMUSCULAR; INTRAVENOUS at 08:12

## 2025-03-03 RX ADMIN — DEXAMETHASONE SODIUM PHOSPHATE 10 MG: 4 INJECTION, SOLUTION INTRAMUSCULAR; INTRAVENOUS at 08:12

## 2025-03-03 RX ADMIN — WATER 2000 MG: 1 INJECTION INTRAMUSCULAR; INTRAVENOUS; SUBCUTANEOUS at 16:47

## 2025-03-03 RX ADMIN — LIDOCAINE HYDROCHLORIDE 100 MG: 20 INJECTION, SOLUTION EPIDURAL; INFILTRATION; INTRACAUDAL; PERINEURAL at 08:13

## 2025-03-03 RX ADMIN — FENTANYL CITRATE 100 MCG: 50 INJECTION, SOLUTION INTRAMUSCULAR; INTRAVENOUS at 08:41

## 2025-03-03 RX ADMIN — OXYCODONE 10 MG: 5 TABLET ORAL at 21:33

## 2025-03-03 RX ADMIN — PHENYLEPHRINE HYDROCHLORIDE 100 MCG: 10 INJECTION INTRAVENOUS at 08:51

## 2025-03-03 RX ADMIN — ASPIRIN 81 MG CHEWABLE TABLET 81 MG: 81 TABLET CHEWABLE at 21:33

## 2025-03-03 RX ADMIN — PHENYLEPHRINE HYDROCHLORIDE 100 MCG: 10 INJECTION INTRAVENOUS at 09:59

## 2025-03-03 RX ADMIN — ROPIVACAINE HYDROCHLORIDE 20 MG: 5 INJECTION, SOLUTION EPIDURAL; INFILTRATION; PERINEURAL at 08:13

## 2025-03-03 RX ADMIN — ROCURONIUM BROMIDE 40 MG: 50 INJECTION INTRAVENOUS at 08:11

## 2025-03-03 RX ADMIN — FENTANYL CITRATE 50 MCG: 50 INJECTION, SOLUTION INTRAMUSCULAR; INTRAVENOUS at 07:12

## 2025-03-03 RX ADMIN — Medication 160 MG: at 08:12

## 2025-03-03 RX ADMIN — ROPIVACAINE HYDROCHLORIDE 29 ML: 5 INJECTION, SOLUTION EPIDURAL; INFILTRATION; PERINEURAL at 07:16

## 2025-03-03 RX ADMIN — MIDAZOLAM 1 MG: 1 INJECTION INTRAMUSCULAR; INTRAVENOUS at 14:47

## 2025-03-03 RX ADMIN — PHENYLEPHRINE HYDROCHLORIDE 100 MCG: 10 INJECTION INTRAVENOUS at 09:31

## 2025-03-03 RX ADMIN — MIDAZOLAM 2 MG: 1 INJECTION INTRAMUSCULAR; INTRAVENOUS at 07:23

## 2025-03-03 RX ADMIN — SODIUM CHLORIDE: 9 INJECTION, SOLUTION INTRAVENOUS at 09:13

## 2025-03-03 RX ADMIN — TRANEXAMIC ACID 1000 MG: 10 INJECTION, SOLUTION INTRAVENOUS at 11:57

## 2025-03-03 RX ADMIN — FENTANYL CITRATE 50 MCG: 50 INJECTION INTRAMUSCULAR; INTRAVENOUS at 14:47

## 2025-03-03 RX ADMIN — OXYCODONE 10 MG: 5 TABLET ORAL at 17:25

## 2025-03-03 RX ADMIN — WATER 2000 MG: 1 INJECTION INTRAMUSCULAR; INTRAVENOUS; SUBCUTANEOUS at 23:58

## 2025-03-03 RX ADMIN — PHENYLEPHRINE HYDROCHLORIDE 100 MCG: 10 INJECTION INTRAVENOUS at 09:40

## 2025-03-03 RX ADMIN — FENTANYL CITRATE 100 MCG: 50 INJECTION, SOLUTION INTRAMUSCULAR; INTRAVENOUS at 10:54

## 2025-03-03 RX ADMIN — PHENYLEPHRINE HYDROCHLORIDE 100 MCG: 10 INJECTION INTRAVENOUS at 10:10

## 2025-03-03 RX ADMIN — SODIUM CHLORIDE: 9 INJECTION, SOLUTION INTRAVENOUS at 07:56

## 2025-03-03 RX ADMIN — MIDAZOLAM 2 MG: 1 INJECTION INTRAMUSCULAR; INTRAVENOUS at 07:12

## 2025-03-03 RX ADMIN — ONDANSETRON 4 MG: 2 INJECTION, SOLUTION INTRAMUSCULAR; INTRAVENOUS at 08:56

## 2025-03-03 RX ADMIN — ROPIVACAINE HYDROCHLORIDE 10 MG: 5 INJECTION, SOLUTION EPIDURAL; INFILTRATION; PERINEURAL at 08:28

## 2025-03-03 RX ADMIN — WATER 2000 MG: 1 INJECTION INTRAMUSCULAR; INTRAVENOUS; SUBCUTANEOUS at 08:16

## 2025-03-03 ASSESSMENT — PAIN SCALES - GENERAL
PAINLEVEL_OUTOF10: 8
PAINLEVEL_OUTOF10: 6
PAINLEVEL_OUTOF10: 8

## 2025-03-03 ASSESSMENT — PAIN DESCRIPTION - LOCATION
LOCATION: SHOULDER
LOCATION: BACK

## 2025-03-03 ASSESSMENT — PAIN DESCRIPTION - DESCRIPTORS
DESCRIPTORS: ACHING;DISCOMFORT
DESCRIPTORS: ACHING;DISCOMFORT;SORE

## 2025-03-03 ASSESSMENT — PAIN - FUNCTIONAL ASSESSMENT
PAIN_FUNCTIONAL_ASSESSMENT: ACTIVITIES ARE NOT PREVENTED
PAIN_FUNCTIONAL_ASSESSMENT: 0-10

## 2025-03-03 ASSESSMENT — PAIN DESCRIPTION - ORIENTATION
ORIENTATION: LEFT
ORIENTATION: LEFT

## 2025-03-03 ASSESSMENT — ENCOUNTER SYMPTOMS
GASTROINTESTINAL NEGATIVE: 1
RESPIRATORY NEGATIVE: 1

## 2025-03-03 NOTE — PROGRESS NOTES
4 Eyes Skin Assessment     NAME:  Hong Shah  YOB: 1970  MEDICAL RECORD NUMBER:  [id number]    The patient is being assessed for  Admission    I agree that at least one RN has performed a thorough Head to Toe Skin Assessment on the patient. ALL assessment sites listed below have been assessed.      Areas assessed by both nurses:    Head, Face, Ears, Shoulders, Back, Chest, Arms, Elbows, Hands, Sacrum. Buttock, Coccyx, Ischium, and Legs. Feet and Heels        Does the Patient have a Wound? No noted wound(s)       Phani Prevention initiated by RN: No  Wound Care Orders initiated by RN: No    Pressure Injury (Stage 3,4, Unstageable, DTI, NWPT, and Complex wounds) if present, place Wound referral order by RN under : No    New Ostomies, if present place, Ostomy referral order under : No     Nurse 1 eSignature: Electronically signed by Taina Welch RN on 3/3/25 at 6:48 PM EST    **SHARE this note so that the co-signing nurse can place an eSignature**    Nurse 2 eSignature: Electronically signed by Eneida Bella RN on 3/6/25 at 5:21 PM EST

## 2025-03-03 NOTE — ANESTHESIA POSTPROCEDURE EVALUATION
Department of Anesthesiology  Postprocedure Note    Patient: Hong Shah  MRN: [id number]  YOB: 1970  Date of evaluation: 3/3/2025    Procedure Summary       Date: 03/03/25 Room / Location: [address]    Anesthesia Start: 0801 Anesthesia Stop: 1108    Procedure: LEFT TOTAL SHOULDER ARTHROPLASTY (Left: Shoulder) Diagnosis:       Arthritis of left shoulder region      (Arthritis of left shoulder region [M19.012])    Surgeons: Stevan Fonseca DO Responsible Provider: Gerber Og DO    Anesthesia Type: general, regional ASA Status: 3            Anesthesia Type: No value filed.    Fei Phase I: Fei Score: 9    Fei Phase II:      Anesthesia Post Evaluation    Patient location during evaluation: PACU  Patient participation: complete - patient participated  Level of consciousness: awake and alert  Pain score: 1  Airway patency: patent  Nausea & Vomiting: no nausea and no vomiting  Cardiovascular status: hemodynamically stable  Respiratory status: acceptable  Hydration status: euvolemic  Pain management: adequate    No notable events documented.

## 2025-03-03 NOTE — CARE COORDINATION
Social Work:    P/O left TSA. Social service met with Kishor, his daughter Bre and sister April. Social work explained her role an discussed discharge planning. Kishor is a patient of Dr. Maldonado and he uses [facility] in [place]. He resides alone in a one floor home and has no durable medical equipment, has never used [facility] or snf. Kishor plans to return home with help from his family and O.P. rehab. Kishor denies any home going needs at this time.    Electronically signed by KAYLAN Gay on 3/3/2025 at 3:33 PM

## 2025-03-03 NOTE — CONSULTS
GENERAL SURGERY  CONSULT NOTE  3/3/2025    Physician Consulted: Dr. Huertas  Reason for Consult: PTX  Referring Physician: Dr. Raymundo AKHTAR  Hong Shah is a 54 y.o. male with a past medical hx of EtOH abuse, osteoarthritis of left shoulder, GERD who presented for planned shoulder arthroplasty. Patient had his post op xrays taken which showed a large left sided PTX with near complete collapse of his left lung. He denies any shortness of breath or chest pain. Resting comfortably on room air during our interview. No prior chest surgeries, no hx of AP or AC use. Never had anything like this before.       Past Medical History:   Diagnosis Date    Alcohol abuse with intoxication, unspecified 8/15/2017    Arthritis     GERD (gastroesophageal reflux disease)        Past Surgical History:   Procedure Laterality Date    CARDIAC CATHETERIZATION  08/03/2012    Dr. Schroeder    ENDOSCOPY, COLON, DIAGNOSTIC      FRACTURE SURGERY      fusion c5,6,7    NECK SURGERY      fusion    PAIN MANAGEMENT PROCEDURE N/A 12/01/2020    C7-T1 EPIDURAL STEROID INJECTION #1 performed by Earnestine Villarreal DO at [facility] OR    PAIN MANAGEMENT PROCEDURE N/A 01/14/2021    C7 - T1 EPIDURAL STEROID INJECTION  #2 performed by Earnestine Villarreal DO at [facility] OR    SHOULDER ARTHROPLASTY Left 3/3/2025    LEFT TOTAL SHOULDER ARTHROPLASTY performed by Stevan Fonseca DO at [facility] OR    SPINE SURGERY  2020    Neck    WISDOM TOOTH EXTRACTION         Medications Prior to Admission    Prior to Admission medications    Medication Sig Start Date End Date Taking? Authorizing Provider   esomeprazole (NEXIUM) 20 MG delayed release capsule Take 1 capsule by mouth daily   Yes Provider, Historical, MD       Allergies   Allergen Reactions    Prednisone      Glaucoma       Family History   Problem Relation Age of Onset    Diabetes Mother     Cancer Mother     High Blood Pressure Mother     Diabetes Father     High Blood Pressure Father     Kidney Disease Father     High Blood Pressure  Sister        Social History     Tobacco Use    Smoking status: Every Day     Current packs/day: 1.00     Average packs/day: 1 pack/day for 30.0 years (30.0 ttl pk-yrs)     Types: Cigarettes    Smokeless tobacco: Never   Vaping Use    Vaping status: Never Used   Substance Use Topics    Alcohol use: Yes     Comment: 2-3 beers per day    Drug use: No         Review of Systems:   Review of Systems   Constitutional: Negative.    HENT: Negative.     Respiratory: Negative.     Cardiovascular: Negative.    Gastrointestinal: Negative.            PHYSICAL EXAM:    Vitals:    03/03/25 1507   BP: 135/89   Pulse: 98   Resp: 18   Temp: 98.2 °F (36.8 °C)   SpO2: 100%       GENERAL:  NAD. A&Ox3.  HEAD:  Normocephalic. Atraumatic.   EYES:   No scleral icterus. PERRL.  LUNGS: no acute respiratory distress.  CARDIOVASCULAR: Hemodynamically stable   ABDOMEN:  Soft, non-distended, non-tender. No guarding, rigidity, rebound.  EXTREMITIES:   MAEx4. Atraumatic. No LE edema. Left shoulder in sling.   SKIN:  Warm and dry  NEUROLOGIC:  No focal neurologic deficits    CBC  No results for input(s): \"WBC\", \"HGB\", \"HCT\", \"MCV\", \"PLT\", \"MPV\" in the last 72 hours.    CMP  Recent Labs     03/03/25  0549   *   K 5.0   CL 93*   CO2 22   BUN 8   CREATININE 0.8   GLUCOSE 101*   CALCIUM 9.6     CXR reviewed showing large left PTX and complete collapse of left lung.       ASSESSMENT/PLAN:  54 y.o. male with likely iatrogenic PTX following left shoulder arthroplasty     Plan  -CT placement today  -fentanyl and versed ordered   -diet as tolerated  -CT to [facility] for dvt chemoppx     Plan will be discussed with Dr. Huertas.    Kasi Lechuga MD  Surgery Resident PGY-4  3/3/2025  4:05 PM    The patient was seen and examined and the chart was reviewed.  I agree with the assessment and plan.  The patient had an iatrogenic pneumothorax possibly related to the preoperative block.  The patient had a chest tube placed.  The pneumothorax has resolved on a

## 2025-03-03 NOTE — OP NOTE
Operative Note      Patient: Hong Shah  YOB: 1970  MRN: [id number]    Date of Procedure: 3/3/2025    Pre-Op Diagnosis Codes:      * Arthritis of left shoulder region [M19.012]    Post-Op Diagnosis: Same       Procedure(s):  LEFT TOTAL SHOULDER ARTHROPLASTY    Surgeon(s):  Stevan Fonseca DO    Assistant:   Surgical Assistant: Salma Hart; Dorinda Crabtree RN    Anesthesia: General    Estimated Blood Loss (mL): less than 100     Complications: None    Specimens:   ID Type Source Tests Collected by Time Destination   A : left humeral head Bone Shoulder SURGICAL PATHOLOGY Stevan Fonseca DO 3/3/2025 0847        Implants:  Implant Name Type Inv. Item Serial No.  Lot No. LRB No. Used Action   PIN BNE FIX L 70 MM DIA2.7 MM HEX STRL IDENTITY - [id number]  PIN BNE FIX L 70 MM DIA2.7 MM HEX STRL IDENTITY  [employer]- [id number] Left 1 Implanted   POST ORTH MOD TRABECULAR MTL [employer]ANCE - [id number]  POST ORTH MOD TRABECULAR MTL [employer]ANCE  [employer]- [id number] Left 1 Implanted   GLENOID SHLDR FOSSA PROSTHESIS PREFAB 4 PEG - [id number]  GLENOID SHLDR FOSSA PROSTHESIS PREFAB 4 PEG  [employer]- [id number] Left 1 Implanted   CEMENT BONE CONFLOW G - [id number]  CEMENT BONE CONFLOW G  [employer]- [id number] Left 1 Implanted   spencer identity humeral head     [id number] Left 1 Implanted   SPENCER IDENTITY HUMERAL HEAD ADAPTOR     [id number] Left 1 Implanted   SPENCER IDENTITY HUMERAL STEM ADAPTER     [id number] Left 1 Implanted   SPENCER IDENTITY HUMERAL HEAD     [id number] Left 1 Implanted         Drains: * No LDAs found *    Findings:  Infection Present At Time Of Surgery (PATOS) (choose all levels that have infection present):  No infection present  Other Findings: Severe primary osteoarthritis, left glenohumeral joint    Detailed Description of Procedure:   Patient was greeted in the preoperative holding area.  All final questions were  to pressurized these 3 peripheral drill holes.  The definitive glenoid component was then impacted into position.  I ensured that there was no remaining cement peripherally.    The arm was once again brought into external rotation.  The protector plate was removed from the humeral broach.  I placed a 48 x 50 mm size head and adjusted the offset to a setting up E.  This seemed to match the patient's native anatomy.  The arm was reduced and there was excellent tension about the glenohumeral joint with good range of motion and no apparent impingement.  I was able to achieve good abduction, forward flexion and external rotation.  The arm was once again dislocated and all trial instruments and broaches were removed.  Using a 2.0 mm drill bit, 4 drill holes were placed into the long head of the biceps tendon groove adjacent to the osteotomy site for later subscapularis repair.  Suture tape were then passed through these drill holes.  A size 12 micro identity humeral stem was then impacted in position, matching the trialed version and depth.  Once the stem was seated into the desired location, the repair sutures for subscapularis repair were placed around the stem.  The humeral head was then assembled on the back table, taking care to manage the trial offset.  The head was then impacted into position and the glenohumeral joint reduced.  Once again, the joint was copiously irrigated with normal saline.  1 g of vancomycin powder was placed intra-articularly.    I used the stay sutures through the subscapularis in order to restore the lesser tuberosity to its desired anatomic location.  With this held in place, the suture tapes were passed through the subscapularis and then tied into position.  There was excellent repair of the subscapularis and lesser tuberosity.  I was then able to carefully mobilize the left shoulder and range of motion manage that of the trial components.    Once again, the wound was copiously irrigated

## 2025-03-03 NOTE — ANESTHESIA PROCEDURE NOTES
Peripheral Block    Patient location during procedure: procedure area  Reason for block: post-op pain management and at surgeon's request  Start time: 3/3/2025 7:16 AM  End time: 3/3/2025 7:21 AM  Staffing  Performed: anesthesiologist   Anesthesiologist: Gerber Og DO  Performed by: Gerber Og DO  Authorized by: Gerber Og DO    Preanesthetic Checklist  Completed: patient identified, IV checked, site marked, risks and benefits discussed, surgical/procedural consents, equipment checked, pre-op evaluation, timeout performed, anesthesia consent given, oxygen available, monitors applied/VS acknowledged, fire risk safety assessment completed and verbalized and blood product R/B/A discussed and consented  Peripheral Block   Patient position: supine  Prep: ChloraPrep  Provider prep: mask and sterile gloves  Patient monitoring: cardiac monitor, continuous pulse ox, frequent blood pressure checks, IV access, oxygen and responsive to questions  Block type: Brachial plexus  Interscalene  Laterality: left  Injection technique: single-shot  Guidance: nerve stimulator and ultrasound guided  Local infiltration: ropivacaine  Infiltration strength: 0.5 %  Local infiltration: ropivacaine  Dose: 1 mL    Needle   Needle type: insulated echogenic nerve stimulator needle (Stimuplex)   Needle gauge: 20 G  Needle localization: nerve stimulator and ultrasound guidance  Needle length: 10 cm  Assessment   Injection assessment: negative aspiration for heme, no paresthesia on injection and local visualized surrounding nerve on ultrasound  Slow fractionated injection: yes  Hemodynamics: stable  Outcomes: uncomplicated and patient tolerated procedure well    Medications Administered  ropivacaine (NAROPIN) injection 0.5% - Perineural, Shoulder Left   29 mL - 3/3/2025 7:16:00 AM

## 2025-03-03 NOTE — PROCEDURES
PROCEDURE NOTE  Date: 3/3/2025   Name: Hong Shah  YOB: 1970    Chest Tube Insertion    Date/Time: 3/3/2025 3:39 PM    Performed by: Brandon Juarez DO  Authorized by: Brandon Juarez DO  Consent: Written consent obtained.  Risks and benefits: risks, benefits and alternatives were discussed  Consent given by: patient  Patient understanding: patient states understanding of the procedure being performed  Patient identity confirmed: verbally with patient and arm band  Time out: Immediately prior to procedure a \"time out\" was called to verify the correct patient, procedure, equipment, support staff and site/side marked as required.  Indications: pneumothorax    Sedation:  Patient sedated: yes  Sedation type: anxiolysis  Sedatives: midazolam  Analgesia: fentanyl    Anesthesia: local infiltration    Anesthesia:  Local Anesthetic: lidocaine 1% without epinephrine  Preparation: skin prepped with chlorhexidine  Placement location: left lateral  Scalpel size: 11  Tube size (Malay): 14.  Ultrasound guidance: no  Tension pneumothorax heard: no  Tube connected to: suction  Drainage characteristics: none.  Suture material: 2-0 silk  Dressing: petrolatum-impregnated gauze and 4x4 sterile gauze  Post-insertion x-ray findings: tube in good position  Patient tolerance: patient tolerated the procedure well with no immediate complications  Comments: Dr. Huertas was available for the procedure.

## 2025-03-03 NOTE — H&P
History and Physical      CHIEF COMPLAINT: Left shoulder pain    History of Present Illness:  Hong Shah is a 54 y.o. year-old male presents for arthroplasty of the left shoulder.  Patient was seen my office.  He had complaints consistent with osteoarthritis of the left shoulder.  He had failed to improve with nonoperative management.  X-rays were consistent with significant osteoarthritis and he was limited in terms of function and overall motion.  Surgery was recommended.  He is here today for this procedure, denying any recent fevers, chills, nausea or vomiting.        Past Medical History:   Diagnosis Date    Alcohol abuse with intoxication, unspecified 8/15/2017    Arthritis     GERD (gastroesophageal reflux disease)          Past Surgical History:   Procedure Laterality Date    CARDIAC CATHETERIZATION  08/03/2012    Dr. Schroeder    ENDOSCOPY, COLON, DIAGNOSTIC      FRACTURE SURGERY      fusion c5,6,7    NECK SURGERY      fusion    PAIN MANAGEMENT PROCEDURE N/A 12/01/2020    C7-T1 EPIDURAL STEROID INJECTION #1 performed by Earnestine Villarreal DO at [facility] OR    PAIN MANAGEMENT PROCEDURE N/A 01/14/2021    C7 - T1 EPIDURAL STEROID INJECTION  #2 performed by Earnestine Villarreal DO at [facility] OR    SPINE SURGERY  2020    Neck    WISDOM TOOTH EXTRACTION         Medications Prior to Admission:    Prior to Admission medications    Medication Sig Start Date End Date Taking? Authorizing Provider   esomeprazole (NEXIUM) 20 MG delayed release capsule Take 1 capsule by mouth daily   Yes Provider, MD Shy       Allergies:    Prednisone    Social History:    reports that he has been smoking cigarettes. He has a 30 pack-year smoking history. He has never used smokeless tobacco. He reports current alcohol use. He reports that he does not use drugs.    Family History:   family history includes Cancer in his mother; Diabetes in his father and mother; High Blood Pressure in his father, mother, and sister; Kidney Disease in his  Cervical radiculopathy    Cervical disc disorder    Chronic pain syndrome    Cervicalgia    Mixed hyperlipidemia    Dermatitis, unspecified    Eczema    Left shoulder pain    Steroid induced glaucoma, right eye    Arthritis of left shoulder region       PLAN:    Plan today is to proceed with left total shoulder arthroplasty.  As I have discussed with the patient, if his rotator cuff is found to be deficient intraoperatively we will plan to instead proceed with reverse shoulder arthroplasty.  Both procedures and subsequent recoveries, all risk/benefits alternatives treatment were reviewed at length with the patient my office.  These were again reviewed today.  These include loss of life, loss of limb, bleeding, infection, damage to surrounding structures and potential need for further surgery.  Informed consent obtained and verified, all questions answered.  He will be kept overnight for medical observation.  Plan for discharge to previous residence tomorrow, postop day 1.    Stevan Fonseca DO   7:01 AM  3/3/2025

## 2025-03-04 ENCOUNTER — APPOINTMENT (OUTPATIENT)
Dept: GENERAL RADIOLOGY | Age: 55
DRG: 483 | End: 2025-03-04
Attending: GENERAL ACUTE CARE HOSPITAL
Payer: COMMERCIAL

## 2025-03-04 PROBLEM — J95.811 POSTPROCEDURAL PNEUMOTHORAX: Status: ACTIVE | Noted: 2025-03-04

## 2025-03-04 LAB
ANION GAP SERPL CALCULATED.3IONS-SCNC: 12 MMOL/L (ref 7–16)
ANION GAP SERPL CALCULATED.3IONS-SCNC: 12 MMOL/L (ref 7–16)
BASOPHILS # BLD: 0.05 K/UL (ref 0–0.2)
BASOPHILS NFR BLD: 0 % (ref 0–2)
BUN SERPL-MCNC: 7 MG/DL (ref 6–20)
BUN SERPL-MCNC: 9 MG/DL (ref 6–20)
CALCIUM SERPL-MCNC: 8.2 MG/DL (ref 8.6–10.2)
CALCIUM SERPL-MCNC: 8.2 MG/DL (ref 8.6–10.2)
CHLORIDE SERPL-SCNC: 90 MMOL/L (ref 98–107)
CHLORIDE SERPL-SCNC: 94 MMOL/L (ref 98–107)
CO2 SERPL-SCNC: 20 MMOL/L (ref 22–29)
CO2 SERPL-SCNC: 22 MMOL/L (ref 22–29)
CREAT SERPL-MCNC: 0.8 MG/DL (ref 0.7–1.2)
CREAT SERPL-MCNC: 1 MG/DL (ref 0.7–1.2)
EOSINOPHIL # BLD: 0.03 K/UL (ref 0.05–0.5)
EOSINOPHILS RELATIVE PERCENT: 0 % (ref 0–6)
ERYTHROCYTE [DISTWIDTH] IN BLOOD BY AUTOMATED COUNT: 13.2 % (ref 11.5–15)
GFR, ESTIMATED: >90 ML/MIN/1.73M2
GFR, ESTIMATED: >90 ML/MIN/1.73M2
GLUCOSE BLD-MCNC: 211 MG/DL (ref 74–99)
GLUCOSE SERPL-MCNC: 124 MG/DL (ref 74–99)
GLUCOSE SERPL-MCNC: 157 MG/DL (ref 74–99)
HCT VFR BLD AUTO: 36 % (ref 37–54)
HGB BLD-MCNC: 12.2 G/DL (ref 12.5–16.5)
IMM GRANULOCYTES # BLD AUTO: 0.04 K/UL (ref 0–0.58)
IMM GRANULOCYTES NFR BLD: 0 % (ref 0–5)
LYMPHOCYTES NFR BLD: 1.31 K/UL (ref 1.5–4)
LYMPHOCYTES RELATIVE PERCENT: 11 % (ref 20–42)
MCH RBC QN AUTO: 31.6 PG (ref 26–35)
MCHC RBC AUTO-ENTMCNC: 33.9 G/DL (ref 32–34.5)
MCV RBC AUTO: 93.3 FL (ref 80–99.9)
MONOCYTES NFR BLD: 1.39 K/UL (ref 0.1–0.95)
MONOCYTES NFR BLD: 12 % (ref 2–12)
NEUTROPHILS NFR BLD: 76 % (ref 43–80)
NEUTS SEG NFR BLD: 8.72 K/UL (ref 1.8–7.3)
OSMOLALITY SERPL: 263 MOSM/KG (ref 285–310)
OSMOLALITY UR: 444 MOSM/KG (ref 300–900)
PLATELET # BLD AUTO: 250 K/UL (ref 130–450)
PMV BLD AUTO: 9.6 FL (ref 7–12)
POTASSIUM SERPL-SCNC: 3.6 MMOL/L (ref 3.5–5)
POTASSIUM SERPL-SCNC: 3.8 MMOL/L (ref 3.5–5)
RBC # BLD AUTO: 3.86 M/UL (ref 3.8–5.8)
SODIUM SERPL-SCNC: 124 MMOL/L (ref 132–146)
SODIUM SERPL-SCNC: 126 MMOL/L (ref 132–146)
SODIUM UR-SCNC: 137 MMOL/L
URATE SERPL-MCNC: 3.1 MG/DL (ref 3.4–7)
WBC OTHER # BLD: 11.5 K/UL (ref 4.5–11.5)

## 2025-03-04 PROCEDURE — 6370000000 HC RX 637 (ALT 250 FOR IP): Performed by: ORTHOPAEDIC SURGERY

## 2025-03-04 PROCEDURE — 96374 THER/PROPH/DIAG INJ IV PUSH: CPT

## 2025-03-04 PROCEDURE — 96376 TX/PRO/DX INJ SAME DRUG ADON: CPT

## 2025-03-04 PROCEDURE — 2500000003 HC RX 250 WO HCPCS: Performed by: GENERAL ACUTE CARE HOSPITAL

## 2025-03-04 PROCEDURE — 84300 ASSAY OF URINE SODIUM: CPT

## 2025-03-04 PROCEDURE — 85025 COMPLETE CBC W/AUTO DIFF WBC: CPT

## 2025-03-04 PROCEDURE — 71045 X-RAY EXAM CHEST 1 VIEW: CPT

## 2025-03-04 PROCEDURE — 6360000002 HC RX W HCPCS: Performed by: GENERAL ACUTE CARE HOSPITAL

## 2025-03-04 PROCEDURE — 83935 ASSAY OF URINE OSMOLALITY: CPT

## 2025-03-04 PROCEDURE — 82962 GLUCOSE BLOOD TEST: CPT

## 2025-03-04 PROCEDURE — 83930 ASSAY OF BLOOD OSMOLALITY: CPT

## 2025-03-04 PROCEDURE — 97165 OT EVAL LOW COMPLEX 30 MIN: CPT

## 2025-03-04 PROCEDURE — 6370000000 HC RX 637 (ALT 250 FOR IP): Performed by: GENERAL ACUTE CARE HOSPITAL

## 2025-03-04 PROCEDURE — 80048 BASIC METABOLIC PNL TOTAL CA: CPT

## 2025-03-04 PROCEDURE — 2580000003 HC RX 258: Performed by: INTERNAL MEDICINE

## 2025-03-04 PROCEDURE — G0378 HOSPITAL OBSERVATION PER HR: HCPCS

## 2025-03-04 PROCEDURE — 84550 ASSAY OF BLOOD/URIC ACID: CPT

## 2025-03-04 PROCEDURE — 99222 1ST HOSP IP/OBS MODERATE 55: CPT | Performed by: STUDENT IN AN ORGANIZED HEALTH CARE EDUCATION/TRAINING PROGRAM

## 2025-03-04 RX ORDER — SODIUM CHLORIDE 9 MG/ML
INJECTION, SOLUTION INTRAVENOUS ONCE
Status: COMPLETED | OUTPATIENT
Start: 2025-03-04 | End: 2025-03-04

## 2025-03-04 RX ORDER — OXYCODONE HYDROCHLORIDE 5 MG/1
5 TABLET ORAL EVERY 4 HOURS PRN
Qty: 30 TABLET | Refills: 0 | Status: SHIPPED | OUTPATIENT
Start: 2025-03-04 | End: 2025-03-11

## 2025-03-04 RX ADMIN — OXYCODONE 10 MG: 5 TABLET ORAL at 05:36

## 2025-03-04 RX ADMIN — SODIUM CHLORIDE, PRESERVATIVE FREE 10 ML: 5 INJECTION INTRAVENOUS at 12:39

## 2025-03-04 RX ADMIN — PANTOPRAZOLE SODIUM 40 MG: 40 TABLET, DELAYED RELEASE ORAL at 05:36

## 2025-03-04 RX ADMIN — SODIUM CHLORIDE: 0.9 INJECTION, SOLUTION INTRAVENOUS at 11:16

## 2025-03-04 RX ADMIN — SODIUM CHLORIDE, PRESERVATIVE FREE 10 ML: 5 INJECTION INTRAVENOUS at 08:41

## 2025-03-04 RX ADMIN — ASPIRIN 81 MG CHEWABLE TABLET 81 MG: 81 TABLET CHEWABLE at 08:40

## 2025-03-04 RX ADMIN — OXYCODONE 10 MG: 5 TABLET ORAL at 11:05

## 2025-03-04 RX ADMIN — MORPHINE SULFATE 4 MG: 4 INJECTION, SOLUTION INTRAMUSCULAR; INTRAVENOUS at 02:41

## 2025-03-04 RX ADMIN — MORPHINE SULFATE 2 MG: 2 INJECTION, SOLUTION INTRAMUSCULAR; INTRAVENOUS at 21:33

## 2025-03-04 RX ADMIN — OXYCODONE 10 MG: 5 TABLET ORAL at 20:22

## 2025-03-04 RX ADMIN — OXYCODONE 10 MG: 5 TABLET ORAL at 01:42

## 2025-03-04 RX ADMIN — SODIUM CHLORIDE, PRESERVATIVE FREE 10 ML: 5 INJECTION INTRAVENOUS at 20:23

## 2025-03-04 RX ADMIN — ASPIRIN 81 MG CHEWABLE TABLET 81 MG: 81 TABLET CHEWABLE at 20:22

## 2025-03-04 RX ADMIN — SODIUM CHLORIDE, PRESERVATIVE FREE 10 ML: 5 INJECTION INTRAVENOUS at 16:15

## 2025-03-04 RX ADMIN — MORPHINE SULFATE 4 MG: 4 INJECTION, SOLUTION INTRAMUSCULAR; INTRAVENOUS at 16:14

## 2025-03-04 RX ADMIN — MORPHINE SULFATE 4 MG: 4 INJECTION, SOLUTION INTRAMUSCULAR; INTRAVENOUS at 12:38

## 2025-03-04 RX ADMIN — MORPHINE SULFATE 4 MG: 4 INJECTION, SOLUTION INTRAMUSCULAR; INTRAVENOUS at 08:41

## 2025-03-04 ASSESSMENT — PAIN SCALES - GENERAL
PAINLEVEL_OUTOF10: 5
PAINLEVEL_OUTOF10: 6
PAINLEVEL_OUTOF10: 5
PAINLEVEL_OUTOF10: 9
PAINLEVEL_OUTOF10: 8
PAINLEVEL_OUTOF10: 10
PAINLEVEL_OUTOF10: 9
PAINLEVEL_OUTOF10: 10
PAINLEVEL_OUTOF10: 7
PAINLEVEL_OUTOF10: 8
PAINLEVEL_OUTOF10: 5
PAINLEVEL_OUTOF10: 6
PAINLEVEL_OUTOF10: 10

## 2025-03-04 ASSESSMENT — PAIN DESCRIPTION - LOCATION
LOCATION: KNEE;CHEST
LOCATION: CHEST
LOCATION: CHEST;SHOULDER
LOCATION: CHEST;SHOULDER
LOCATION: GENERALIZED
LOCATION: CHEST;SHOULDER
LOCATION: CHEST;SHOULDER
LOCATION: SHOULDER
LOCATION: CHEST;SHOULDER

## 2025-03-04 ASSESSMENT — PAIN - FUNCTIONAL ASSESSMENT
PAIN_FUNCTIONAL_ASSESSMENT: ACTIVITIES ARE NOT PREVENTED

## 2025-03-04 ASSESSMENT — PAIN DESCRIPTION - ORIENTATION
ORIENTATION: LEFT

## 2025-03-04 ASSESSMENT — PAIN DESCRIPTION - DESCRIPTORS
DESCRIPTORS: ACHING;DISCOMFORT

## 2025-03-04 NOTE — PROGRESS NOTES
Department of Orthopedic Surgery  Shoulder Service  Attending Progress Note        Subjective: Patient admits to soreness in the left shoulder.  Denies shortness of breath at this time.  No acute issues overnight.    Vitals  VITALS:  BP (!) 186/112   Pulse (!) 108   Temp 98.6 °F (37 °C) (Oral)   Resp 18   Ht 1.803 m (5' 11\")   Wt 66.7 kg (147 lb)   SpO2 100%   BMI 20.50 kg/m²     PHYSICAL EXAM:    Orientation:  alert and oriented to person, place and time    Left Upper Extremity    Incision:  dressing in place, clean, dry, and intact    Upper Extremity Motor:  deltoids/biceps/triceps/wirst flexion/wrist extension/finger flexion/finger extension 5/5 bilaterally  Upper Extremity Sensory:  Axillary:  intact  Median:  intact  Radial:  intact  Ulnar:  intact  Vascular:  Radial:  2  Ulnar:  2    Brace/Sling: Sling intact    HgB:    Lab Results   Component Value Date/Time    HGB 12.2 03/04/2025 02:33 AM     CBC:   Lab Results   Component Value Date/Time    WBC 11.5 03/04/2025 02:33 AM    RBC 3.86 03/04/2025 02:33 AM    HGB 12.2 03/04/2025 02:33 AM    HCT 36.0 03/04/2025 02:33 AM    MCV 93.3 03/04/2025 02:33 AM    MCH 31.6 03/04/2025 02:33 AM    MCHC 33.9 03/04/2025 02:33 AM    RDW 13.2 03/04/2025 02:33 AM     03/04/2025 02:33 AM    MPV 9.6 03/04/2025 02:33 AM       ASSESSMENT AND PLAN:    Post operative day 1 status post left total shoulder arthoplasty    1:  Continue physical therapy and occupational therapy  2:  Discharge Plan: Home  3: Patient doing well.  Discussed with general surgery following the procedure yesterday, as he was noted to have a large pneumothorax on his standard postoperative x-rays.  Likely related to the nerve block prior to the procedure.  Nonetheless, he is tolerating breathing on room air and has had a chest tube placed by general surgery.  From orthopedic standpoint he is doing well.  Discharge will ultimately depend on recommendations from general surgery as well as medicine, as

## 2025-03-04 NOTE — PROGRESS NOTES
GENERAL SURGERY  DAILY PROGRESS NOTE  3/4/2025  Cc: No chief complaint on file.      SUBJECTIVE:  No new complaints or overnight events.     OBJECTIVE:  BP (!) 151/70   Pulse 90   Temp 98.6 °F (37 °C) (Oral)   Resp 17   Ht 1.803 m (5' 11\")   Wt 66.7 kg (147 lb)   SpO2 98%   BMI 20.50 kg/m²   I/O last 3 completed shifts:  In: -   Out: 1700 [Urine:1600; Blood:100]    GENERAL: No acute distress.  HEAD: NCAT.   EYES: Anicteric. Round symmetric pupils.  CV: RR.  LUNGS/CHEST: No increased work of breathing on RA, L chest tube without air leak.  ABDOMEN: Soft, no tenderness, non distended.    LABS:  CBC  Recent Labs     03/04/25  0233   WBC 11.5   RBC 3.86   HGB 12.2*   HCT 36.0*   MCV 93.3   MCH 31.6   MCHC 33.9   RDW 13.2      MPV 9.6     BMP  Recent Labs     03/03/25  0549 03/04/25  0233   * 126*   K 5.0 3.6   CL 93* 94*   CO2 22 20*   BUN 8 9   CREATININE 0.8 1.0   GLUCOSE 101* 157*   CALCIUM 9.6 8.2*       RADIOLOGY:  I have personally reviewed all relevant imaging:      ASSESSMENT/PLAN:  54 y.o. male with likely iatrogenic PTX following left shoulder arthroplasty s/p chest tube 3/3     Plan  -CT placed 3/3 with resolution of PTX  - no air leak on exam this morning   - am CXR pending  -diet as tolerated  -CT to continuous wall suction   -ok for dvt chemoppx   - possible waterseal CT today pending CXR    Brandon Juarez DO  General Surgery Resident, PGY-1    Electronically signed by Brandon Juarez DO on 3/4/25 at 6:58 AM EST    The patient was seen and examined and the chart was reviewed.  I agree with the assessment and plan.  The chest tube will be removed if no residual pneumothorax is noted on repeat chest x-ray.  Tom Huertas MD

## 2025-03-04 NOTE — CONSULTS
[facility] Hospitalist Group   Consult for Medical Management    Reason for Consult:  hyponatremia      History of Present Illness:      This is a 54 year old male with past medical history of alcohol abuse, arthritis, GERD, tobacco abuse who presented to hospital for left shoulder arthroplasty which he underwent 3/3/25. Post op xrays showed iatrogenic PTX following left should arthroplasty. A chest today was placed yesterday with surgery following. Labs this morning showed Na 126, Cl 94, Hgb 12.2, CO2 20, calcium 8.2. We are consulted for hyponatremia.     Informant(s) for H&P: patient, epic     REVIEW OF SYSTEMS:  A comprehensive review of systems was negative except for: what is in the HPI      PMH:  Past Medical History:   Diagnosis Date    Alcohol abuse with intoxication, unspecified 8/15/2017    Arthritis     GERD (gastroesophageal reflux disease)        Surgical History:  Past Surgical History:   Procedure Laterality Date    CARDIAC CATHETERIZATION  08/03/2012    Dr. Schroeder    ENDOSCOPY, COLON, DIAGNOSTIC      FRACTURE SURGERY      fusion c5,6,7    NECK SURGERY      fusion    PAIN MANAGEMENT PROCEDURE N/A 12/01/2020    C7-T1 EPIDURAL STEROID INJECTION #1 performed by Earnestine Villarreal DO at [facility] OR    PAIN MANAGEMENT PROCEDURE N/A 01/14/2021    C7 - T1 EPIDURAL STEROID INJECTION  #2 performed by Earnestine Villarreal DO at [facility] OR    SHOULDER ARTHROPLASTY Left 3/3/2025    LEFT TOTAL SHOULDER ARTHROPLASTY performed by Stevan Fonseca DO at [facility] OR    SPINE SURGERY  2020    Neck    WISDOM TOOTH EXTRACTION         Medications Prior to Admission:    Prior to Admission medications    Medication Sig Start Date End Date Taking? Authorizing Provider   esomeprazole (NEXIUM) 20 MG delayed release capsule Take 1 capsule by mouth daily   Yes Provider, Historical, MD       Allergies:    Prednisone    Social History:    reports that he has been smoking cigarettes. He has a 30 pack-year smoking history. He has never

## 2025-03-04 NOTE — PATIENT CARE CONFERENCE
P Quality Flow/Interdisciplinary Rounds Progress Note        Quality Flow Rounds held on March 4, 2025    Disciplines Attending:  Bedside Nurse, , , and Nursing Unit Leadership    Hong Shah was admitted on 3/3/2025  5:18 AM    Anticipated Discharge Date:  Expected Discharge Date: 03/04/25    Disposition:    Phani Score:  Phani Scale Score: 20    [facility] RISK OF UNPLANNED READMISSION 2.0             0 Total Score        Discussed patient goal for the day, patient clinical progression, and barriers to discharge.  The following Goal(s) of the Day/Commitment(s) have been identified:   Discharge planning.      Lizbet Bernabe RN  March 4, 2025

## 2025-03-04 NOTE — PROGRESS NOTES
Call out to [facility] answering service regarding morning labs sodium level 126.

## 2025-03-04 NOTE — PROGRESS NOTES
Occupational Therapy    OCCUPATIONAL THERAPY INITIAL EVALUATION    [facility]   [address], [place]         Date:3/4/2025                                                  Patient Name: Hong Shah    MRN: [id number]    : 1970    Room: [address] [place], [place] [address]      Evaluating OT: Nikky Cortez OTR/L   [id number]      Referring Provider:Stevan Fonseca DO     Specific Provider Orders/Date:OT eval and treat 3/4/2025      Diagnosis:  Arthritis of left shoulder region [M19.012]    Procedure: LEFT TOTAL SHOULDER ARTHROPLASTY (Left: Shoulder)    Per surgery note: likely iatrogenic PTX following left shoulder arthroplasty   chest tube placed     Pertinent Medical History: alcohol abuse, spine surgery       Precautions:  Fall Risk, NWB L LE  sling, assume no shoulder ROM - no formal order in chart  Chest tube     Assessment of current deficits    [x] Functional mobility  [x]ADLs  [x] Strength               []Cognition    [x] Functional transfers   [x] IADLs         [x] Safety Awareness   [x]Endurance    [] Fine Coordination              [x] Balance      [] Vision/perception   []Sensation     []Gross Motor Coordination  [] ROM  [] Delirium                   [] Motor Control     OT PLAN OF CARE   OT POC based on physician orders, patient diagnosis and results of clinical assessment    Frequency/Duration  2-3 days/wk for  PRN   Specific OT Treatment Interventions to include:   ADL retraining/adapted techniques and AE recommendations to increase functional independence within precautions                    Energy conservation techniques to improve tolerance for selfcare routine   Functional transfer/mobility training/DME recommendations for increased independence, safety and fall prevention         Patient/family education to increase safety and functional independence             Environmental modifications for safe mobility and completion of ADLs                        digits - patient reports still feeling numbness   Tolerate UE therapeutic activity/exercises to increase strength/endurance for ADL/xfer activity  Education      Hand Dominance right    Hearing: WFL   Sensation:  No c/o numbness or tingling   Tone: WFL  Edema: none observed     Comments: Upon arrival patient lying in bed .  At end of session, patient sitting up in chair  with call light and phone within reach, all lines and tubes intact.  *ALARM ON     Overall patient demonstrated  decreased independence and safety during completion of ADL/functional transfer/mobility tasks.  Pt would benefit from continued skilled OT to increase safety and independence with completion of ADL/IADL tasks for functional independence and quality of life.          Rehab Potential: good for established goals     Patient / Family Goal: return home      Patient and/or family were instructed on functional diagnosis, prognosis/goals and OT plan of care. Demonstrated good  understanding.     Eval Complexity: Low    Time In: 1551  Time Out: 1610      Min Units   OT Eval Low 97165 x  1   OT Eval Medium 82692      OT Eval High 47424      OT Re-Eval 67718       Therapeutic Ex 96974      Therapeutic Activities 77187       ADL/Self Care 13553      Orthotic Management 45528       Manual 49731     Neuro Re-Ed 75339       Non-Billable Time       OT Re eval 09929        Evaluation Time additionally includes thorough review of current medical information, gathering information on past medical history/social history and prior level of function, interpretation of standardized testing/informal observation of tasks, assessment of data and development of plan of care and goals.            Nikky Cortez  OTR/L  OT [id number]

## 2025-03-05 ENCOUNTER — APPOINTMENT (OUTPATIENT)
Dept: GENERAL RADIOLOGY | Age: 55
DRG: 483 | End: 2025-03-05
Attending: GENERAL ACUTE CARE HOSPITAL
Payer: COMMERCIAL

## 2025-03-05 LAB
ANION GAP SERPL CALCULATED.3IONS-SCNC: 11 MMOL/L (ref 7–16)
ANION GAP SERPL CALCULATED.3IONS-SCNC: 12 MMOL/L (ref 7–16)
ANION GAP SERPL CALCULATED.3IONS-SCNC: 12 MMOL/L (ref 7–16)
BASOPHILS # BLD: 0.06 K/UL (ref 0–0.2)
BASOPHILS NFR BLD: 1 % (ref 0–2)
BUN SERPL-MCNC: 5 MG/DL (ref 6–20)
BUN SERPL-MCNC: 6 MG/DL (ref 6–20)
BUN SERPL-MCNC: 9 MG/DL (ref 6–20)
CALCIUM SERPL-MCNC: 8.4 MG/DL (ref 8.6–10.2)
CALCIUM SERPL-MCNC: 8.7 MG/DL (ref 8.6–10.2)
CALCIUM SERPL-MCNC: 8.7 MG/DL (ref 8.6–10.2)
CHLORIDE SERPL-SCNC: 86 MMOL/L (ref 98–107)
CHLORIDE SERPL-SCNC: 86 MMOL/L (ref 98–107)
CHLORIDE SERPL-SCNC: 87 MMOL/L (ref 98–107)
CHLORIDE UR-SCNC: 104 MMOL/L
CO2 SERPL-SCNC: 23 MMOL/L (ref 22–29)
CO2 SERPL-SCNC: 24 MMOL/L (ref 22–29)
CO2 SERPL-SCNC: 25 MMOL/L (ref 22–29)
CREAT SERPL-MCNC: 0.7 MG/DL (ref 0.7–1.2)
CREAT SERPL-MCNC: 0.8 MG/DL (ref 0.7–1.2)
CREAT SERPL-MCNC: 0.8 MG/DL (ref 0.7–1.2)
CREAT UR-MCNC: 68.2 MG/DL (ref 40–278)
EOSINOPHIL # BLD: 0.04 K/UL (ref 0.05–0.5)
EOSINOPHILS RELATIVE PERCENT: 0 % (ref 0–6)
ERYTHROCYTE [DISTWIDTH] IN BLOOD BY AUTOMATED COUNT: 12.9 % (ref 11.5–15)
GFR, ESTIMATED: >90 ML/MIN/1.73M2
GLUCOSE SERPL-MCNC: 120 MG/DL (ref 74–99)
GLUCOSE SERPL-MCNC: 125 MG/DL (ref 74–99)
GLUCOSE SERPL-MCNC: 126 MG/DL (ref 74–99)
HCT VFR BLD AUTO: 38 % (ref 37–54)
HGB BLD-MCNC: 13.3 G/DL (ref 12.5–16.5)
IMM GRANULOCYTES # BLD AUTO: 0.06 K/UL (ref 0–0.58)
IMM GRANULOCYTES NFR BLD: 1 % (ref 0–5)
LYMPHOCYTES NFR BLD: 0.96 K/UL (ref 1.5–4)
LYMPHOCYTES RELATIVE PERCENT: 8 % (ref 20–42)
MCH RBC QN AUTO: 32.4 PG (ref 26–35)
MCHC RBC AUTO-ENTMCNC: 35 G/DL (ref 32–34.5)
MCV RBC AUTO: 92.5 FL (ref 80–99.9)
MONOCYTES NFR BLD: 1.32 K/UL (ref 0.1–0.95)
MONOCYTES NFR BLD: 11 % (ref 2–12)
NEUTROPHILS NFR BLD: 80 % (ref 43–80)
NEUTS SEG NFR BLD: 9.92 K/UL (ref 1.8–7.3)
OSMOLALITY UR: 394 MOSM/KG (ref 300–900)
PLATELET # BLD AUTO: 234 K/UL (ref 130–450)
PMV BLD AUTO: 9.8 FL (ref 7–12)
POTASSIUM SERPL-SCNC: 3.9 MMOL/L (ref 3.5–5)
POTASSIUM SERPL-SCNC: 3.9 MMOL/L (ref 3.5–5)
POTASSIUM SERPL-SCNC: 4 MMOL/L (ref 3.5–5)
POTASSIUM, UR: 21 MMOL/L
RBC # BLD AUTO: 4.11 M/UL (ref 3.8–5.8)
SODIUM SERPL-SCNC: 122 MMOL/L (ref 132–146)
SODIUM UR-SCNC: 130 MMOL/L
TSH SERPL DL<=0.05 MIU/L-ACNC: 0.92 UIU/ML (ref 0.27–4.2)
UUN UR-MCNC: 279 MG/DL (ref 800–1666)
WBC OTHER # BLD: 12.4 K/UL (ref 4.5–11.5)

## 2025-03-05 PROCEDURE — 6360000002 HC RX W HCPCS: Performed by: GENERAL ACUTE CARE HOSPITAL

## 2025-03-05 PROCEDURE — 84540 ASSAY OF URINE/UREA-N: CPT

## 2025-03-05 PROCEDURE — 85025 COMPLETE CBC W/AUTO DIFF WBC: CPT

## 2025-03-05 PROCEDURE — 82570 ASSAY OF URINE CREATININE: CPT

## 2025-03-05 PROCEDURE — 84443 ASSAY THYROID STIM HORMONE: CPT

## 2025-03-05 PROCEDURE — 97110 THERAPEUTIC EXERCISES: CPT

## 2025-03-05 PROCEDURE — 2580000003 HC RX 258

## 2025-03-05 PROCEDURE — 82436 ASSAY OF URINE CHLORIDE: CPT

## 2025-03-05 PROCEDURE — 1200000000 HC SEMI PRIVATE

## 2025-03-05 PROCEDURE — 36415 COLL VENOUS BLD VENIPUNCTURE: CPT

## 2025-03-05 PROCEDURE — G0378 HOSPITAL OBSERVATION PER HR: HCPCS

## 2025-03-05 PROCEDURE — 2580000003 HC RX 258: Performed by: INTERNAL MEDICINE

## 2025-03-05 PROCEDURE — 6370000000 HC RX 637 (ALT 250 FOR IP): Performed by: GENERAL ACUTE CARE HOSPITAL

## 2025-03-05 PROCEDURE — 99232 SBSQ HOSP IP/OBS MODERATE 35: CPT | Performed by: STUDENT IN AN ORGANIZED HEALTH CARE EDUCATION/TRAINING PROGRAM

## 2025-03-05 PROCEDURE — 80048 BASIC METABOLIC PNL TOTAL CA: CPT

## 2025-03-05 PROCEDURE — 71045 X-RAY EXAM CHEST 1 VIEW: CPT

## 2025-03-05 PROCEDURE — 97535 SELF CARE MNGMENT TRAINING: CPT

## 2025-03-05 PROCEDURE — 6370000000 HC RX 637 (ALT 250 FOR IP)

## 2025-03-05 PROCEDURE — 6370000000 HC RX 637 (ALT 250 FOR IP): Performed by: ORTHOPAEDIC SURGERY

## 2025-03-05 PROCEDURE — 84133 ASSAY OF URINE POTASSIUM: CPT

## 2025-03-05 PROCEDURE — 84300 ASSAY OF URINE SODIUM: CPT

## 2025-03-05 PROCEDURE — 83935 ASSAY OF URINE OSMOLALITY: CPT

## 2025-03-05 RX ORDER — AMLODIPINE BESYLATE 5 MG/1
5 TABLET ORAL DAILY
Status: DISCONTINUED | OUTPATIENT
Start: 2025-03-05 | End: 2025-03-07 | Stop reason: HOSPADM

## 2025-03-05 RX ORDER — SODIUM CHLORIDE 9 MG/ML
INJECTION, SOLUTION INTRAVENOUS CONTINUOUS
Status: DISCONTINUED | OUTPATIENT
Start: 2025-03-05 | End: 2025-03-05

## 2025-03-05 RX ORDER — 3% SODIUM CHLORIDE 3 G/100ML
25 INJECTION, SOLUTION INTRAVENOUS CONTINUOUS
Status: DISPENSED | OUTPATIENT
Start: 2025-03-05 | End: 2025-03-05

## 2025-03-05 RX ADMIN — SODIUM CHLORIDE: 0.9 INJECTION, SOLUTION INTRAVENOUS at 15:21

## 2025-03-05 RX ADMIN — ASPIRIN 81 MG CHEWABLE TABLET 81 MG: 81 TABLET CHEWABLE at 09:24

## 2025-03-05 RX ADMIN — PANTOPRAZOLE SODIUM 40 MG: 40 TABLET, DELAYED RELEASE ORAL at 05:18

## 2025-03-05 RX ADMIN — SODIUM CHLORIDE 25 ML/HR: 3 INJECTION, SOLUTION INTRAVENOUS at 19:56

## 2025-03-05 RX ADMIN — OXYCODONE 10 MG: 5 TABLET ORAL at 09:24

## 2025-03-05 RX ADMIN — ASPIRIN 81 MG CHEWABLE TABLET 81 MG: 81 TABLET CHEWABLE at 19:43

## 2025-03-05 RX ADMIN — OXYCODONE 10 MG: 5 TABLET ORAL at 19:43

## 2025-03-05 RX ADMIN — OXYCODONE 10 MG: 5 TABLET ORAL at 14:47

## 2025-03-05 RX ADMIN — OXYCODONE 10 MG: 5 TABLET ORAL at 05:18

## 2025-03-05 RX ADMIN — MORPHINE SULFATE 2 MG: 2 INJECTION, SOLUTION INTRAMUSCULAR; INTRAVENOUS at 11:47

## 2025-03-05 RX ADMIN — AMLODIPINE BESYLATE 5 MG: 5 TABLET ORAL at 15:24

## 2025-03-05 RX ADMIN — OXYCODONE 10 MG: 5 TABLET ORAL at 00:35

## 2025-03-05 ASSESSMENT — PAIN DESCRIPTION - DESCRIPTORS
DESCRIPTORS: ACHING;DISCOMFORT

## 2025-03-05 ASSESSMENT — PAIN DESCRIPTION - ORIENTATION
ORIENTATION: LEFT

## 2025-03-05 ASSESSMENT — PAIN DESCRIPTION - LOCATION
LOCATION: SHOULDER;CHEST
LOCATION: SHOULDER;CHEST
LOCATION: CHEST;SHOULDER

## 2025-03-05 ASSESSMENT — PAIN SCALES - GENERAL
PAINLEVEL_OUTOF10: 10
PAINLEVEL_OUTOF10: 8
PAINLEVEL_OUTOF10: 10
PAINLEVEL_OUTOF10: 10
PAINLEVEL_OUTOF10: 6
PAINLEVEL_OUTOF10: 7
PAINLEVEL_OUTOF10: 9

## 2025-03-05 ASSESSMENT — PAIN - FUNCTIONAL ASSESSMENT: PAIN_FUNCTIONAL_ASSESSMENT: ACTIVITIES ARE NOT PREVENTED

## 2025-03-05 NOTE — PROGRESS NOTES
Post pull CXR without PTX. Ok for discharge from general surgery standpoint.     -Kasi Lechuga MD PGY 4

## 2025-03-05 NOTE — PLAN OF CARE
Problem: Discharge Planning  Goal: Discharge to home or other facility with appropriate resources  3/4/2025 2155 by Jalyn Daniels RN  Outcome: Progressing  3/4/2025 1857 by Beharry, Phyllis, RN  Outcome: Progressing     Problem: Pain  Goal: Verbalizes/displays adequate comfort level or baseline comfort level  3/4/2025 2155 by Jalyn Daniels RN  Outcome: Progressing  3/4/2025 1857 by Beharry, Phyllis, RN  Outcome: Progressing     Problem: Safety - Adult  Goal: Free from fall injury  3/4/2025 2155 by Jalyn Daniels RN  Outcome: Progressing  3/4/2025 1857 by Beharry, Phyllis, RN  Outcome: Progressing     Problem: ABCDS Injury Assessment  Goal: Absence of physical injury  3/4/2025 2155 by Jalyn Daniels RN  Outcome: Progressing  3/4/2025 1857 by Beharry, Phyllis, RN  Outcome: Progressing

## 2025-03-05 NOTE — PROGRESS NOTES
[facility] Hospitalist Consult Progress Note    SYNOPSIS: Patient admitted on 3/3/2025 for Arthritis of left shoulder region      This is a 54 year old male with past medical history of alcohol abuse, arthritis, GERD, tobacco abuse who presented to hospital for left shoulder arthroplasty which he underwent 3/3/25. Post op xrays showed iatrogenic PTX following left should arthroplasty. A chest today was placed yesterday with surgery following. Labs this morning showed Na 126, Cl 94, Hgb 12.2, CO2 20, calcium 8.2. We are consulted for hyponatremia.     3/5 patient continues to do well clinically, still hyponatremic, chest tube was removed today.  Tolerated well.  Nephrology consulted for further recommendations regarding hyponatremia.  Urine electrolytes and urine osmolality sent.  Patient states he drinks 18-24 beers per week. Patient has not been eating and drinking that well during this admission despite polyuria.     SUBJECTIVE:  Stable overnight. No other overnight issues reported.   Patient seen and examined  Records reviewed.         Temp (24hrs), Av.5 °F (36.9 °C), Min:97.8 °F (36.6 °C), Max:98.8 °F (37.1 °C)    DIET: ADULT DIET; Regular; No Fluids on Tray  CODE: Full Code    Intake/Output Summary (Last 24 hours) at 3/5/2025 1529  Last data filed at 3/5/2025 1225  Gross per 24 hour   Intake 60 ml   Output 2200 ml   Net -2140 ml       Review of Systems  All bolded are positive; please see HPI  General:  Fever, chills, diaphoresis, fatigue, malaise, night sweats, weight loss  Psychological:  Anxiety, disorientation, hallucinations.  ENT:  Epistaxis, headaches, vertigo, visual changes.  Cardiovascular:  Chest pain-postprocedure, irregular heartbeats, palpitations, paroxysmal nocturnal dyspnea.  Respiratory:  Shortness of breath, coughing, sputum production, hemoptysis, wheezing, orthopnea.  Gastrointestinal:  Nausea, vomiting, diarrhea, heartburn, constipation, abdominal pain, hematemesis,

## 2025-03-05 NOTE — PLAN OF CARE
Problem: Discharge Planning  Goal: Discharge to home or other facility with appropriate resources  3/5/2025 0833 by Eneida Bella RN  Outcome: Progressing  3/4/2025 2155 by Jalyn Daniels RN  Outcome: Progressing  3/4/2025 1857 by Beharry, Phyllis, RN  Outcome: Progressing     Problem: Pain  Goal: Verbalizes/displays adequate comfort level or baseline comfort level  3/5/2025 0833 by Eneida Bella RN  Outcome: Progressing  3/4/2025 2155 by Jalyn Daniels RN  Outcome: Progressing  3/4/2025 1857 by Beharry, Phyllis, RN  Outcome: Progressing     Problem: Safety - Adult  Goal: Free from fall injury  3/5/2025 0833 by Eneida Bella RN  Outcome: Progressing  3/4/2025 2155 by Jalyn Daniels RN  Outcome: Progressing  3/4/2025 1857 by Beharry, Phyllis, RN  Outcome: Progressing     Problem: ABCDS Injury Assessment  Goal: Absence of physical injury  3/5/2025 0833 by Eneida Bella RN  Outcome: Progressing  3/4/2025 2155 by Jalyn Daniels RN  Outcome: Progressing  3/4/2025 1857 by Beharry, Phyllis, RN  Outcome: Progressing

## 2025-03-05 NOTE — CONSULTS
Department of Internal Medicine  Nephrology Nurse Practitioner Consult Note      Reason for Consult: Hyponatremia  Requesting Physician:  Tom Frias MD    CHIEF COMPLAINT: Outpatient surgery    History Obtained From:  patient, electronic medical record    HISTORY OF PRESENT ILLNESS: Briefly Mr. Hong Shah is a 54-year-old male with past medical history of GERD, alcohol abuse, tobacco use who underwent a left shoulder arthroplasty on 3/3/2025.  Preop lab work showed sodium 128 and has progressively worsened to 122 mmol/L, reason for this consultation.  Patient had a large left-sided pneumothorax postsurgery with chest tube insertion.  Patient states he drinks 18-24 beers per week.  Patient has not been eating and drinking that well during this admission despite relatively high adequate urine output.    Past Medical History:        Diagnosis Date    Alcohol abuse with intoxication, unspecified 8/15/2017    Arthritis     GERD (gastroesophageal reflux disease)      Past Surgical History:        Procedure Laterality Date    CARDIAC CATHETERIZATION  08/03/2012    Dr. Schroeder    ENDOSCOPY, COLON, DIAGNOSTIC      FRACTURE SURGERY      fusion c5,6,7    NECK SURGERY      fusion    PAIN MANAGEMENT PROCEDURE N/A 12/01/2020    C7-T1 EPIDURAL STEROID INJECTION #1 performed by Earnestine Villarreal DO at [facility] OR    PAIN MANAGEMENT PROCEDURE N/A 01/14/2021    C7 - T1 EPIDURAL STEROID INJECTION  #2 performed by Earnestine Villarreal DO at [facility] OR    SHOULDER ARTHROPLASTY Left 3/3/2025    LEFT TOTAL SHOULDER ARTHROPLASTY performed by Stevan Fonseca DO at [facility] OR    SPINE SURGERY  2020    Neck    WISDOM TOOTH EXTRACTION       Current Medications:    Current Facility-Administered Medications: 0.9 % sodium chloride infusion, , IntraVENous, Continuous  pantoprazole (PROTONIX) tablet 40 mg, 40 mg, Oral, QAM AC  [Held by provider] 0.9 % sodium chloride infusion, , IntraVENous, Continuous  sodium chloride flush 0.9 % injection 5-40 mL, 5-40 mL,  IntraVENous, 2 times per day  sodium chloride flush 0.9 % injection 5-40 mL, 5-40 mL, IntraVENous, PRN  0.9 % sodium chloride infusion, , IntraVENous, PRN  ondansetron (ZOFRAN-ODT) disintegrating tablet 4 mg, 4 mg, Oral, Q8H PRN **OR** ondansetron (ZOFRAN) injection 4 mg, 4 mg, IntraVENous, Q6H PRN  oxyCODONE (ROXICODONE) immediate release tablet 5 mg, 5 mg, Oral, Q4H PRN **OR** oxyCODONE (ROXICODONE) immediate release tablet 10 mg, 10 mg, Oral, Q4H PRN  morphine (PF) injection 2 mg, 2 mg, IntraVENous, Q2H PRN **OR** morphine sulfate (PF) injection 4 mg, 4 mg, IntraVENous, Q2H PRN  lidocaine 1 % injection 20 mL, 20 mL, IntraDERmal, Once  nicotine (NICODERM CQ) 21 MG/24HR 1 patch, 1 patch, TransDERmal, Daily  aspirin chewable tablet 81 mg, 81 mg, Oral, BID  Allergies:  Prednisone    Social History:    TOBACCO:   reports that he has been smoking cigarettes. He has a 30 pack-year smoking history. He has never used smokeless tobacco.  ETOH:   reports current alcohol use.  DRUGS:   reports no history of drug use.    Family History:       Problem Relation Age of Onset    Diabetes Mother     Cancer Mother     High Blood Pressure Mother     Diabetes Father     High Blood Pressure Father     Kidney Disease Father     High Blood Pressure Sister      REVIEW OF SYSTEMS:    CONSTITUTIONAL:  negative  EYES:  negative  HEENT:  negative  RESPIRATORY:  negative  CARDIOVASCULAR:  negative  GASTROINTESTINAL:  negative  GENITOURINARY:  negative  INTEGUMENT/BREAST:  negative  HEMATOLOGIC/LYMPHATIC:  negative  ALLERGIC/IMMUNOLOGIC:  negative  ENDOCRINE:  negative  MUSCULOSKELETAL:  negative  NEUROLOGICAL:  negative  PHYSICAL EXAM:      Vitals:    VITALS:  BP (!) 143/104   Pulse (!) 103   Temp 98.6 °F (37 °C)   Resp 18   Ht 1.803 m (5' 11\")   Wt 66.7 kg (147 lb)   SpO2 97%   BMI 20.50 kg/m²   24HR INTAKE/OUTPUT:    Intake/Output Summary (Last 24 hours) at 3/5/2025 9641  Last data filed at 3/5/2025 0550  Gross per 24 hour   Intake

## 2025-03-05 NOTE — PROGRESS NOTES
GENERAL SURGERY  DAILY PROGRESS NOTE  3/5/2025  Cc: No chief complaint on file.      SUBJECTIVE:  No new complaints or overnight events. Some pain at L chest tube site. No SOB.    OBJECTIVE:  BP (!) 146/82   Pulse (!) 103   Temp 98.7 °F (37.1 °C) (Oral)   Resp 18   Ht 1.803 m (5' 11\")   Wt 66.7 kg (147 lb)   SpO2 98%   BMI 20.50 kg/m²   I/O last 3 completed shifts:  In: 60 [P.O.:60]  Out: 3300 [Urine:3300]    GENERAL: No acute distress.  HEAD: NCAT.   EYES: Anicteric. Round symmetric pupils.  CV: RR.  LUNGS/CHEST: No increased work of breathing on RA, L chest tube to water seal without air leak.  ABDOMEN: Soft, no tenderness, non distended.    LABS:  CBC  Recent Labs     03/04/25  0233 03/05/25  0637   WBC 11.5 12.4*   RBC 3.86 4.11   HGB 12.2* 13.3   HCT 36.0* 38.0   MCV 93.3 92.5   MCH 31.6 32.4   MCHC 33.9 35.0*   RDW 13.2 12.9    234   MPV 9.6 9.8     BMP  Recent Labs     03/04/25  0233 03/04/25  1620 03/05/25  0042   * 124* 122*   K 3.6 3.8 3.9   CL 94* 90* 87*   CO2 20* 22 23   BUN 9 7 6   CREATININE 1.0 0.8 0.7   GLUCOSE 157* 124* 126*   CALCIUM 8.2* 8.2* 8.4*       RADIOLOGY:  I have personally reviewed all relevant imaging:      ASSESSMENT/PLAN:  54 y.o. male with likely iatrogenic PTX following left shoulder arthroplasty s/p chest tube 3/3     Plan  -CT placed 3/3 with resolution of PTX  - no air leak on exam this morning   - am CXR pending  -diet as tolerated  -ok for dvt chemoppx   - chest tube removal pending am cxr    Brandon Juarez DO  General Surgery Resident, PGY-1  The patient was seen and examined and the chart was reviewed.  I agree with the assessment and plan.  The chest tube was removed.  The post chest tube x-ray is pending.  Tom Huertas MD

## 2025-03-05 NOTE — PROGRESS NOTES
Occupational Therapy  OT BEDSIDE TREATMENT NOTE      Date:3/5/2025  Patient Name: Hong Shah  MRN: [id number]  : 1970  Room: [address] [place] [address]A         Evaluating OT: Nikky Cortez OTR/L   [id number]       Referring Provider:Stevan Fonseca DO     Specific Provider Orders/Date:OT eval and treat 3/4/2025       Diagnosis:  Arthritis of left shoulder region [M19.012]    Procedure: LEFT TOTAL SHOULDER ARTHROPLASTY (Left: Shoulder)    Per surgery note: likely iatrogenic PTX following left shoulder arthroplasty   chest tube placed     Pertinent Medical History: alcohol abuse, spine surgery       Precautions:  Fall Risk, NWB L LE  sling, assume no shoulder ROM - no formal order in chart,  Chest tube      Assessment of current deficits    [x] Functional mobility            [x]ADLs           [x] Strength                  []Cognition    [x] Functional transfers          [x] IADLs         [x] Safety Awareness   [x]Endurance    [] Fine Coordination                         [x] Balance      [] Vision/perception   []Sensation      []Gross Motor Coordination             [] ROM           [] Delirium                   [] Motor Control      OT PLAN OF CARE   OT POC based on physician orders, patient diagnosis and results of clinical assessment     Frequency/Duration  2-3 days/wk for  PRN   Specific OT Treatment Interventions to include:   ADL retraining/adapted techniques and AE recommendations to increase functional independence within precautions                    Energy conservation techniques to improve tolerance for selfcare routine   Functional transfer/mobility training/DME recommendations for increased independence, safety and fall prevention         Patient/family education to increase safety and functional independence             Environmental modifications for safe mobility and completion of ADLs                             Therapeutic activity to improve functional performance during ADLs.

## 2025-03-05 NOTE — CONSULTS
Comprehensive Nutrition Assessment    Type and Reason for Visit:  Initial, Consult (Poor appetite/intake; ONS)    Nutrition Recommendations/Plan:   Continue current diet  Start Gelatein BID to promote protein/energy intake  Will continue to monitor while inpatient     Malnutrition Assessment:  Malnutrition Status:  At risk for malnutrition (03/05/25 6249)    Context:  Acute Illness     Findings of the 6 clinical characteristics of malnutrition:  Energy Intake:  Mild decrease in energy intake (since adm)  Weight Loss:  Unable to assess (lack of actual wt hx <1 yr)     Body Fat Loss:  No body fat loss     Muscle Mass Loss:  No muscle mass loss    Fluid Accumulation:  No fluid accumulation     Strength:  Not Performed    Nutrition Assessment:    Pt w/ L shoulder arthritis s/p L shoulder arthroplasty 3/3; iatrogenic PTX s/p chest tube placement 3/3 & removed at bedside today; hyponatremia 2/2 beer potomania. Hx ETOH abuse. Pt states decreased intake since adm d/t not liking the food. Continue current diet. Will add Gelatein BID to promote protein/energy intake. Will continue to monitor.    Nutrition Related Findings:    A&O x4, abd WDL, +BS, no edema, -4.9 L, Na 122 Wound Type: Surgical Incision       Current Nutrition Intake & Therapies:    Average Meal Intake: 51-75%  Average Supplements Intake: None Ordered  ADULT DIET; Regular; No Fluids on Tray  ADULT ORAL NUTRITION SUPPLEMENT; Breakfast, Dinner; Fortified Gelatin Oral Supplement    Anthropometric Measures:  Height: 180.3 cm (5' 11\")  Ideal Body Weight (IBW): 172 lbs (78 kg)    Admission Body Weight: 66.7 kg (147 lb) (3/3 unspecified method)  Current Body Weight: 69.9 kg (154 lb 1.6 oz) (3/5), 89.6 % IBW. Weight Source: Bed scale  Current BMI (kg/m2): 21.5  Usual Body Weight:  (no actual wt hx in EMR <1 yr)        Weight Adjustment For: No Adjustment                 BMI Categories: Normal Weight (BMI 18.5-24.9)    Estimated Daily Nutrient Needs:  Energy  Requirements Based On: Formula  Weight Used for Energy Requirements: Current  Energy (kcal/day): 3273-8380  Weight Used for Protein Requirements: Current  Protein (g/day):   Method Used for Fluid Requirements: Defer to provider  Fluid (ml/day): No fluids on tray    Nutrition Diagnosis:   Inadequate protein-energy intake related to limited food acceptance as evidenced by intake 51-75%, patient reported barriers    Nutrition Interventions:   Food and/or Nutrient Delivery: Continue Current Diet, Start Oral Nutrition Supplement  Nutrition Education/Counseling: No recommendation at this time  Coordination of Nutrition Care: Continue to monitor while inpatient       Goals:  Goals: PO intake 75% or greater, by next RD assessment  Type of Goal: New goal  Previous Goal Met: New Goal    Nutrition Monitoring and Evaluation:   Behavioral-Environmental Outcomes: None Identified  Food/Nutrient Intake Outcomes: Food and Nutrient Intake, Supplement Intake  Physical Signs/Symptoms Outcomes: Biochemical Data, GI Status, Fluid Status or Edema, Nutrition Focused Physical Findings, Skin, Weight    Discharge Planning:    Too soon to determine     FREDDIE CARTWRIGHT MPH, RD, LD  Contact: x [phone]

## 2025-03-05 NOTE — PROGRESS NOTES
Left chest tube removed at beside after maximal inspiration with breath held.  No drainage.  No evidence of infection.  No audible air leak.  No complications.  Pt stable without any changes in vital signs.  Occlusive dressing with petroleum gauze placed. Repeat chest x ray ordered for 4 hours.    Electronically signed by Vickie Bettencourt DO on 3/5/2025 at 11:14 AM

## 2025-03-05 NOTE — PROGRESS NOTES
P Quality Flow/Interdisciplinary Rounds Progress Note        Quality Flow Rounds held on March 5, 2025    Disciplines Attending:  Bedside Nurse, , , and Nursing Unit Leadership    Hong Shah was admitted on 3/3/2025  5:18 AM    Anticipated Discharge Date:  Expected Discharge Date: 03/04/25    Disposition:    Phani Score:  Phani Scale Score: 20    [facility] RISK OF UNPLANNED READMISSION 2.0             0 Total Score        Discussed patient goal for the day, patient clinical progression, and barriers to discharge.  The following Goal(s) of the Day/Commitment(s) have been identified:   Discharge Planning      Laurel Han RN  March 5, 2025

## 2025-03-06 LAB
ANION GAP SERPL CALCULATED.3IONS-SCNC: 10 MMOL/L (ref 7–16)
ANION GAP SERPL CALCULATED.3IONS-SCNC: 10 MMOL/L (ref 7–16)
ANION GAP SERPL CALCULATED.3IONS-SCNC: 11 MMOL/L (ref 7–16)
ANION GAP SERPL CALCULATED.3IONS-SCNC: 13 MMOL/L (ref 7–16)
ANION GAP SERPL CALCULATED.3IONS-SCNC: 8 MMOL/L (ref 7–16)
BUN SERPL-MCNC: 10 MG/DL (ref 6–20)
BUN SERPL-MCNC: 14 MG/DL (ref 6–20)
BUN SERPL-MCNC: 16 MG/DL (ref 6–20)
CALCIUM SERPL-MCNC: 8.7 MG/DL (ref 8.6–10.2)
CALCIUM SERPL-MCNC: 8.8 MG/DL (ref 8.6–10.2)
CALCIUM SERPL-MCNC: 8.8 MG/DL (ref 8.6–10.2)
CALCIUM SERPL-MCNC: 8.9 MG/DL (ref 8.6–10.2)
CALCIUM SERPL-MCNC: 8.9 MG/DL (ref 8.6–10.2)
CHLORIDE SERPL-SCNC: 91 MMOL/L (ref 98–107)
CHLORIDE SERPL-SCNC: 93 MMOL/L (ref 98–107)
CHLORIDE SERPL-SCNC: 98 MMOL/L (ref 98–107)
CO2 SERPL-SCNC: 23 MMOL/L (ref 22–29)
CO2 SERPL-SCNC: 25 MMOL/L (ref 22–29)
CO2 SERPL-SCNC: 27 MMOL/L (ref 22–29)
CORTIS SERPL-MCNC: 8.9 UG/DL (ref 2.7–18.4)
CREAT SERPL-MCNC: 0.7 MG/DL (ref 0.7–1.2)
CREAT SERPL-MCNC: 0.7 MG/DL (ref 0.7–1.2)
CREAT SERPL-MCNC: 0.8 MG/DL (ref 0.7–1.2)
GFR, ESTIMATED: >90 ML/MIN/1.73M2
GLUCOSE SERPL-MCNC: 100 MG/DL (ref 74–99)
GLUCOSE SERPL-MCNC: 108 MG/DL (ref 74–99)
GLUCOSE SERPL-MCNC: 110 MG/DL (ref 74–99)
GLUCOSE SERPL-MCNC: 121 MG/DL (ref 74–99)
GLUCOSE SERPL-MCNC: 121 MG/DL (ref 74–99)
POTASSIUM SERPL-SCNC: 3.9 MMOL/L (ref 3.5–5)
POTASSIUM SERPL-SCNC: 4.2 MMOL/L (ref 3.5–5)
POTASSIUM SERPL-SCNC: 4.2 MMOL/L (ref 3.5–5)
POTASSIUM SERPL-SCNC: 4.5 MMOL/L (ref 3.5–5)
POTASSIUM SERPL-SCNC: 4.8 MMOL/L (ref 3.5–5)
SODIUM SERPL-SCNC: 126 MMOL/L (ref 132–146)
SODIUM SERPL-SCNC: 126 MMOL/L (ref 132–146)
SODIUM SERPL-SCNC: 127 MMOL/L (ref 132–146)
SODIUM SERPL-SCNC: 127 MMOL/L (ref 132–146)
SODIUM SERPL-SCNC: 131 MMOL/L (ref 132–146)
URATE SERPL-MCNC: 2.7 MG/DL (ref 3.4–7)

## 2025-03-06 PROCEDURE — 6370000000 HC RX 637 (ALT 250 FOR IP): Performed by: INTERNAL MEDICINE

## 2025-03-06 PROCEDURE — 2580000003 HC RX 258: Performed by: INTERNAL MEDICINE

## 2025-03-06 PROCEDURE — 82533 TOTAL CORTISOL: CPT

## 2025-03-06 PROCEDURE — 84550 ASSAY OF BLOOD/URIC ACID: CPT

## 2025-03-06 PROCEDURE — 6370000000 HC RX 637 (ALT 250 FOR IP)

## 2025-03-06 PROCEDURE — 80048 BASIC METABOLIC PNL TOTAL CA: CPT

## 2025-03-06 PROCEDURE — 1200000000 HC SEMI PRIVATE

## 2025-03-06 PROCEDURE — 2500000003 HC RX 250 WO HCPCS: Performed by: GENERAL ACUTE CARE HOSPITAL

## 2025-03-06 PROCEDURE — 6370000000 HC RX 637 (ALT 250 FOR IP): Performed by: GENERAL ACUTE CARE HOSPITAL

## 2025-03-06 PROCEDURE — 6370000000 HC RX 637 (ALT 250 FOR IP): Performed by: ORTHOPAEDIC SURGERY

## 2025-03-06 RX ORDER — 3% SODIUM CHLORIDE 3 G/100ML
25 INJECTION, SOLUTION INTRAVENOUS CONTINUOUS
Status: DISPENSED | OUTPATIENT
Start: 2025-03-06 | End: 2025-03-06

## 2025-03-06 RX ORDER — SODIUM CHLORIDE 1 G/1
2 TABLET ORAL
Status: DISCONTINUED | OUTPATIENT
Start: 2025-03-06 | End: 2025-03-07 | Stop reason: HOSPADM

## 2025-03-06 RX ORDER — SENNOSIDES A AND B 8.6 MG/1
1 TABLET, FILM COATED ORAL NIGHTLY
Status: DISCONTINUED | OUTPATIENT
Start: 2025-03-06 | End: 2025-03-07 | Stop reason: HOSPADM

## 2025-03-06 RX ADMIN — SODIUM CHLORIDE, PRESERVATIVE FREE 10 ML: 5 INJECTION INTRAVENOUS at 22:05

## 2025-03-06 RX ADMIN — OXYCODONE 10 MG: 5 TABLET ORAL at 05:22

## 2025-03-06 RX ADMIN — OXYCODONE 10 MG: 5 TABLET ORAL at 09:25

## 2025-03-06 RX ADMIN — OXYCODONE 10 MG: 5 TABLET ORAL at 17:59

## 2025-03-06 RX ADMIN — OXYCODONE 10 MG: 5 TABLET ORAL at 13:30

## 2025-03-06 RX ADMIN — OXYCODONE 10 MG: 5 TABLET ORAL at 22:03

## 2025-03-06 RX ADMIN — SENNOSIDES 8.6 MG: 8.6 TABLET, COATED ORAL at 22:03

## 2025-03-06 RX ADMIN — PANTOPRAZOLE SODIUM 40 MG: 40 TABLET, DELAYED RELEASE ORAL at 05:22

## 2025-03-06 RX ADMIN — ASPIRIN 81 MG CHEWABLE TABLET 81 MG: 81 TABLET CHEWABLE at 09:25

## 2025-03-06 RX ADMIN — OXYCODONE 10 MG: 5 TABLET ORAL at 01:11

## 2025-03-06 RX ADMIN — ASPIRIN 81 MG CHEWABLE TABLET 81 MG: 81 TABLET CHEWABLE at 22:02

## 2025-03-06 RX ADMIN — Medication 2 G: at 17:02

## 2025-03-06 RX ADMIN — SODIUM CHLORIDE 25 ML/HR: 3 INJECTION, SOLUTION INTRAVENOUS at 12:35

## 2025-03-06 RX ADMIN — AMLODIPINE BESYLATE 5 MG: 5 TABLET ORAL at 09:25

## 2025-03-06 ASSESSMENT — PAIN DESCRIPTION - LOCATION
LOCATION: SHOULDER
LOCATION: ARM
LOCATION: SHOULDER
LOCATION: SHOULDER

## 2025-03-06 ASSESSMENT — PAIN DESCRIPTION - DESCRIPTORS
DESCRIPTORS: ACHING;DISCOMFORT
DESCRIPTORS: ACHING;DISCOMFORT;SORE
DESCRIPTORS: ACHING
DESCRIPTORS: ACHING;DISCOMFORT

## 2025-03-06 ASSESSMENT — PAIN SCALES - GENERAL
PAINLEVEL_OUTOF10: 7
PAINLEVEL_OUTOF10: 6
PAINLEVEL_OUTOF10: 7
PAINLEVEL_OUTOF10: 2
PAINLEVEL_OUTOF10: 10
PAINLEVEL_OUTOF10: 7
PAINLEVEL_OUTOF10: 7
PAINLEVEL_OUTOF10: 2
PAINLEVEL_OUTOF10: 2
PAINLEVEL_OUTOF10: 8
PAINLEVEL_OUTOF10: 7
PAINLEVEL_OUTOF10: 9

## 2025-03-06 ASSESSMENT — PAIN - FUNCTIONAL ASSESSMENT
PAIN_FUNCTIONAL_ASSESSMENT: ACTIVITIES ARE NOT PREVENTED
PAIN_FUNCTIONAL_ASSESSMENT: ACTIVITIES ARE NOT PREVENTED

## 2025-03-06 ASSESSMENT — PAIN DESCRIPTION - ORIENTATION
ORIENTATION: RIGHT;LEFT
ORIENTATION: LEFT

## 2025-03-06 NOTE — PLAN OF CARE
Problem: Discharge Planning  Goal: Discharge to home or other facility with appropriate resources  3/5/2025 2021 by Jalyn Daniels RN  Outcome: Progressing  3/5/2025 0833 by Eneida Bella RN  Outcome: Progressing     Problem: Pain  Goal: Verbalizes/displays adequate comfort level or baseline comfort level  3/5/2025 2021 by Jalyn Daniels RN  Outcome: Progressing  3/5/2025 0833 by Eneida Bella RN  Outcome: Progressing     Problem: Safety - Adult  Goal: Free from fall injury  3/5/2025 2021 by Jalyn Daniels RN  Outcome: Progressing  3/5/2025 0833 by Eneida Bella RN  Outcome: Progressing     Problem: ABCDS Injury Assessment  Goal: Absence of physical injury  3/5/2025 2021 by Jalyn Daniels RN  Outcome: Progressing  3/5/2025 0833 by Eneida Bella RN  Outcome: Progressing     Problem: Nutrition Deficit:  Goal: Optimize nutritional status  Outcome: Progressing

## 2025-03-06 NOTE — CARE COORDINATION
Social Work:    Chart update.Left TSA on March 3rrd. Chest tube inserted due to pneumothorax on 3rd. Chest tube removed on the 5th.  Nephrology following due to sodium/labs. Hx of alcohol abuse noted, as well as smoking. Social service made a follow-up visit with Hong and provided information about our Peer Recovery team, as well as our Tobacco clinic.  Kishor advises that he did go into rehab years ago but does not feel his drinking at this point in time warrants intervention. Kishor did express a desire to stop smoking and has tried nicotine patches & Chantix to no avail.  Kishor advises that he received a call from [facility] and they are following with orders from Dr. Fonseca. Social work confirmed [facility] is following and prompted for nursing and therapy orders.  Social work provided alcohol resources if needed at any point in time, as well as information about [facility] Tobacco Clinic.  [facility] will need notified on discharge day.    Electronically signed by KAYLAN Gay on 3/6/2025 at 9:49 AM

## 2025-03-06 NOTE — PROGRESS NOTES
Department of Internal Medicine  Nephrology Nurse Practitioner Consult Note    Events reviewed.    SUBJECTIVE: We are following Hong Shah for hyponatremia. Patient reports no complaints.     PHYSICAL EXAM:      Vitals:    VITALS:  /89   Pulse 95   Temp 98.8 °F (37.1 °C) (Oral)   Resp 18   Ht 1.803 m (5' 11\")   Wt 69.9 kg (154 lb 3.2 oz)   SpO2 96%   BMI 21.51 kg/m²   24HR INTAKE/OUTPUT:    Intake/Output Summary (Last 24 hours) at 3/6/2025 1437  Last data filed at 3/6/2025 0529  Gross per 24 hour   Intake 60 ml   Output 800 ml   Net -740 ml     Scheduled Meds:   sodium chloride  2 g Oral TID WC    amLODIPine  5 mg Oral Daily    pantoprazole  40 mg Oral QAM AC    sodium chloride flush  5-40 mL IntraVENous 2 times per day    lidocaine  20 mL IntraDERmal Once    nicotine  1 patch TransDERmal Daily    aspirin  81 mg Oral BID     Continuous Infusions:   3% sodium chloride 25 mL/hr (03/06/25 1235)    [Held by provider] sodium chloride Stopped (03/03/25 1445)    sodium chloride       PRN Meds:.sodium chloride flush, sodium chloride, ondansetron **OR** ondansetron, oxyCODONE **OR** oxyCODONE, morphine **OR** morphine     Constitutional:  Awake, alert, oriented, in NAD  HEENT:  PERRLA, normocephalic, atraumatic  Respiratory:  CTA  Cardiovascular/Edema:  RRR, normal S1, normal S2  Gastrointestinal:  Soft, flat, non-distended, non-tender  Neurologic:  Nonfocal  Skin:  warm, dry, no rashes, no lesions    DATA:    CBC:   Lab Results   Component Value Date/Time    WBC 12.4 03/05/2025 06:37 AM    RBC 4.11 03/05/2025 06:37 AM    HGB 13.3 03/05/2025 06:37 AM    HCT 38.0 03/05/2025 06:37 AM    MCV 92.5 03/05/2025 06:37 AM    MCH 32.4 03/05/2025 06:37 AM    MCHC 35.0 03/05/2025 06:37 AM    RDW 12.9 03/05/2025 06:37 AM     03/05/2025 06:37 AM    MPV 9.8 03/05/2025 06:37 AM     CMP:    Lab Results   Component Value Date/Time     03/06/2025 09:45 AM    K 3.9 03/06/2025 09:45 AM    CL 91 03/06/2025 09:45 AM     CO2 27 03/06/2025 09:45 AM    BUN 10 03/06/2025 09:45 AM    CREATININE 0.8 03/06/2025 09:45 AM    GFRAA >60 07/19/2022 03:47 PM    LABGLOM >90 03/06/2025 09:45 AM    LABGLOM >60 07/20/2023 04:18 PM    GLUCOSE 121 03/06/2025 09:45 AM    GLUCOSE 97 08/15/2017 05:06 AM    CALCIUM 8.8 03/06/2025 09:45 AM    BILITOT 0.3 02/04/2025 02:53 PM    ALKPHOS 70 02/04/2025 02:53 PM    AST 29 02/04/2025 02:53 PM    ALT 26 02/04/2025 02:53 PM     Magnesium:  No results found for: \"MG\"  Phosphorus:  No results found for: \"PHOS\"  Radiology Review:    XR CHEST PORTABLE 3/5/25    IMPRESSION:  Left-sided chest tube is unchanged with no pneumothorax.        BRIEF SUMMARY OF INITIAL CONSULT:    Briefly Mr. Hong Shah is a 54-year-old male with past medical history of GERD, alcohol abuse, tobacco use who underwent a left shoulder arthroplasty on 3/3/2025.  Preop lab work showed sodium 128 and has progressively worsened to 122 mmol/L, reason for this consultation.  Patient had a large left-sided pneumothorax postsurgery with chest tube insertion.  Patient states he drinks 18-24 beers per week.  Patient has not been eating and drinking that well during this admission despite relatively high adequate urine output.    IMPRESSION/RECOMMENDATIONS:    Hypotonic hyponatremia, SIADH (probably drug-induced-opioids administration, pain, alcohol withdrawal?) urine osmolality 444, urine sodium 137, uric acid level 3.1 repeated 2.7, rule out adrenal insufficiency.  Sodium levels improved with 3% sodium chloride administration, to repeat urine studies tomorrow, to obtain cortisol level, to repeat 3% and start salt tablets today.    HTN, on amlodipine  ------------------------------------------  Alcohol abuse  Tobacco abuse  Iatrogenic pneumothorax, surgery following, chest tube removed today    Plan:    Repeat 3% sodium chloride 25 cc/hour x 4 hours  Sodium chloride tabs 2 g PO TID  Obtain cortisol level  Continue amlodipine 5 mg p.o. daily  Repeat

## 2025-03-06 NOTE — PROGRESS NOTES
GENERAL SURGERY  DAILY PROGRESS NOTE  3/6/2025    Subjective:  Feeling well this morning, no shortness of breath, denies any respiratory complaints.       Objective:  Last 24Hrs  Temp  Av.7 °F (37.1 °C)  Min: 98.6 °F (37 °C)  Max: 98.8 °F (37.1 °C)  Resp  Av.3  Min: 16  Max: 18  Pulse  Av.8  Min: 90  Max: 107  Systolic (24hrs), Av , Min:140 , Max:148     Diastolic (24hrs), Av, Min:90, Max:104    SpO2  Av.1 %  Min: 97 %  Max: 99 %    I/O last 3 completed shifts:  In: 120 [P.O.:120]  Out: 3000 [Urine:3000]      General: Lying in bed, NAD  Cardiovascular: Warm throughout  Respiratory: Equal chest rise bilaterally, no retractions, no audible wheezing, occlusive dressing in place   Abdomen: soft, NTTP throughout, ND  Skin: no obvious rashes or lesions appreciated  Extremities: atraumatic, no focal motor deficits, no open wounds, left arm in sling        CBC  Recent Labs     25  0233 25  0637   WBC 11.5 12.4*   RBC 3.86 4.11   HGB 12.2* 13.3   HCT 36.0* 38.0   MCV 93.3 92.5   MCH 31.6 32.4   MCHC 33.9 35.0*   RDW 13.2 12.9    234   MPV 9.6 9.8       CMP  Recent Labs     25  1400 25  0018 25  0420   * 127* 127*   K 4.0 4.2 4.2   CL 86* 93* 91*   CO2 25 23 23   BUN 9 10 10   CREATININE 0.8 0.7 0.7   GLUCOSE 120* 121* 108*   CALCIUM 8.7 8.7 8.9         Assessment/Plan:  54 y.o. male with likely iatrogenic PTX, s/p chest tube placement now with resolution     Plan  -diet as tolerated  -ct removed yesterday without post pull ptx   -ok for discharge from general surgery standpoint    Vickie Bettencourt DO  General Surgery Resident, PGY-2    Electronically signed on 3/6/2025 at 7:30 AM    The patient was seen and examined and the chart was reviewed.  I agree with the assessment and plan.  The chest tube has been removed.  No recurrent pneumothorax is noted.  Tom Huertas MD

## 2025-03-07 VITALS
OXYGEN SATURATION: 99 % | SYSTOLIC BLOOD PRESSURE: 133 MMHG | DIASTOLIC BLOOD PRESSURE: 99 MMHG | HEIGHT: 71 IN | TEMPERATURE: 98.8 F | BODY MASS INDEX: 21.59 KG/M2 | HEART RATE: 92 BPM | RESPIRATION RATE: 16 BRPM | WEIGHT: 154.2 LBS

## 2025-03-07 PROBLEM — E87.1 HYPONATREMIA: Status: ACTIVE | Noted: 2025-03-07

## 2025-03-07 LAB
ANION GAP SERPL CALCULATED.3IONS-SCNC: 10 MMOL/L (ref 7–16)
BUN SERPL-MCNC: 18 MG/DL (ref 6–20)
CALCIUM SERPL-MCNC: 9 MG/DL (ref 8.6–10.2)
CHLORIDE SERPL-SCNC: 95 MMOL/L (ref 98–107)
CO2 SERPL-SCNC: 26 MMOL/L (ref 22–29)
CREAT SERPL-MCNC: 0.9 MG/DL (ref 0.7–1.2)
GFR, ESTIMATED: >90 ML/MIN/1.73M2
GLUCOSE SERPL-MCNC: 109 MG/DL (ref 74–99)
POTASSIUM SERPL-SCNC: 4.1 MMOL/L (ref 3.5–5)
SODIUM SERPL-SCNC: 131 MMOL/L (ref 132–146)
SURGICAL PATHOLOGY REPORT: NORMAL

## 2025-03-07 PROCEDURE — 97110 THERAPEUTIC EXERCISES: CPT

## 2025-03-07 PROCEDURE — 6370000000 HC RX 637 (ALT 250 FOR IP)

## 2025-03-07 PROCEDURE — 6370000000 HC RX 637 (ALT 250 FOR IP): Performed by: ORTHOPAEDIC SURGERY

## 2025-03-07 PROCEDURE — 80048 BASIC METABOLIC PNL TOTAL CA: CPT

## 2025-03-07 PROCEDURE — 6370000000 HC RX 637 (ALT 250 FOR IP): Performed by: GENERAL ACUTE CARE HOSPITAL

## 2025-03-07 PROCEDURE — 2500000003 HC RX 250 WO HCPCS: Performed by: GENERAL ACUTE CARE HOSPITAL

## 2025-03-07 PROCEDURE — 97535 SELF CARE MNGMENT TRAINING: CPT

## 2025-03-07 RX ORDER — SODIUM CHLORIDE 1 G/1
1 TABLET ORAL 3 TIMES DAILY
Qty: 90 TABLET | Refills: 0 | Status: SHIPPED | OUTPATIENT
Start: 2025-03-07

## 2025-03-07 RX ADMIN — OXYCODONE 10 MG: 5 TABLET ORAL at 10:56

## 2025-03-07 RX ADMIN — Medication 2 G: at 08:07

## 2025-03-07 RX ADMIN — PANTOPRAZOLE SODIUM 40 MG: 40 TABLET, DELAYED RELEASE ORAL at 06:43

## 2025-03-07 RX ADMIN — Medication 2 G: at 10:56

## 2025-03-07 RX ADMIN — AMLODIPINE BESYLATE 5 MG: 5 TABLET ORAL at 08:07

## 2025-03-07 RX ADMIN — OXYCODONE 10 MG: 5 TABLET ORAL at 06:43

## 2025-03-07 RX ADMIN — SODIUM CHLORIDE, PRESERVATIVE FREE 10 ML: 5 INJECTION INTRAVENOUS at 08:08

## 2025-03-07 RX ADMIN — ASPIRIN 81 MG CHEWABLE TABLET 81 MG: 81 TABLET CHEWABLE at 08:07

## 2025-03-07 ASSESSMENT — PAIN - FUNCTIONAL ASSESSMENT: PAIN_FUNCTIONAL_ASSESSMENT: ACTIVITIES ARE NOT PREVENTED

## 2025-03-07 ASSESSMENT — PAIN DESCRIPTION - LOCATION
LOCATION: ARM
LOCATION: SHOULDER

## 2025-03-07 ASSESSMENT — PAIN DESCRIPTION - ORIENTATION
ORIENTATION: LEFT
ORIENTATION: LEFT

## 2025-03-07 ASSESSMENT — PAIN DESCRIPTION - DESCRIPTORS
DESCRIPTORS: THROBBING;SQUEEZING;STABBING
DESCRIPTORS: ACHING;DISCOMFORT;SORE

## 2025-03-07 ASSESSMENT — PAIN SCALES - GENERAL
PAINLEVEL_OUTOF10: 7
PAINLEVEL_OUTOF10: 10

## 2025-03-07 NOTE — PLAN OF CARE
Problem: Discharge Planning  Goal: Discharge to home or other facility with appropriate resources  3/6/2025 2341 by Luz Hamilton, RN  Outcome: Progressing  3/6/2025 2341 by Luz Hamilton, RN  Outcome: Progressing     Problem: Pain  Goal: Verbalizes/displays adequate comfort level or baseline comfort level  Outcome: Progressing     Problem: Safety - Adult  Goal: Free from fall injury  Outcome: Progressing     Problem: ABCDS Injury Assessment  Goal: Absence of physical injury  Outcome: Progressing     Problem: Nutrition Deficit:  Goal: Optimize nutritional status  Outcome: Progressing

## 2025-03-07 NOTE — PROGRESS NOTES
GENERAL SURGERY  DAILY PROGRESS NOTE  3/7/2025    Subjective:  No overnight events or respiratory complaints. On room air.       Objective:  Last 24Hrs  Temp  Av.8 °F (37.1 °C)  Min: 98.1 °F (36.7 °C)  Max: 99.5 °F (37.5 °C)  Resp  Av.3  Min: 16  Max: 18  Pulse  Av.8  Min: 92  Max: 97  Systolic (24hrs), Av , Min:129 , Max:141     Diastolic (24hrs), Av, Min:82, Max:99    SpO2  Av.8 %  Min: 96 %  Max: 99 %    I/O last 3 completed shifts:  In: 60 [P.O.:60]  Out: 800 [Urine:800]      General: Lying in bed, NAD  Cardiovascular: Warm throughout  Respiratory: Equal chest rise bilaterally, no retractions, no audible wheezing, occlusive dressing in place   Abdomen: soft, NTTP throughout, ND  Skin: no obvious rashes or lesions appreciated  Extremities: atraumatic, no focal motor deficits, no open wounds, left arm in sling        CBC  Recent Labs     25  0637   WBC 12.4*   RBC 4.11   HGB 13.3   HCT 38.0   MCV 92.5   MCH 32.4   MCHC 35.0*   RDW 12.9      MPV 9.8       CMP  Recent Labs     25  1800 25  2200 25  0248   * 131* 131*   K 4.5 4.8 4.1   CL 91* 98 95*   CO2 25 23 26   BUN 14 16 18   CREATININE 0.8 0.8 0.9   GLUCOSE 100* 110* 109*   CALCIUM 8.9 8.8 9.0         Assessment/Plan:  54 y.o. male with likely iatrogenic PTX, s/p chest tube placement now with resolution     Plan  -diet as tolerated  -ct removed yesterday without post pull ptx   -ok for discharge from general surgery standpoint    Brandon Juarez DO  General Surgery Resident, PGY-1    Electronically signed on 3/7/2025 at 7:48 AM

## 2025-03-07 NOTE — PROGRESS NOTES
Therapeutic exercise to improve tolerance and functional strength for ADLs    Balance retraining/tolerance tasks for facilitation of postural control with dynamic challenges during ADLs .      Positioning to improve functional independence     Recommended Adaptive Equipment: continue to assess      Home Living: Pt lives alone, 1 story with steps    family in area to assist   Bathroom setup: tub/shower       Prior Level of Function: Independent with ADLs , and  with IADLs; ambulated with no device         Pain Level: Pt did not rate pain.   Cognition: Awake and alert.                   Functional Assessment:  AM-PAC Daily Activity Raw Score: 17/24    Initial Eval Status  Date: 3/4 /2025 Treatment Status  Date: 3/7/25  STGs = LTGs  Time frame: 10-14 days   Feeding Set-up    Independent   Grooming SBA/set-up   Standing at sink    Independent   UB Dressing Assist   (Sling remained on this session - patient with increase pain from chest tube and shoulder )      Needs reinforcement/education on dressing tech  Pt instructed with technique for UB dressing and sling wear.    Min A to don shirt.   Mod A to reposition sling.    SBA    LB Dressing Min A  Setup   Pt able to don underwear, pants, and new socks.   SBA    Bathing Min A    SBA    Toileting Standing  at commode   supervision  Independent   Bed Mobility  Supervision   Supine to sit   independent  Independent   Functional Transfers Supervision   Sit-stand from bed, chair  Independent      Independent   Functional Mobility Supervision, no device   Chest tube,   Ambulated to/from bathroom  Independent in the room.  Independent with good tolerance    Balance Sitting:     Static:  Independent    Dynamic:independent  Standing: supervision    Independent   Activity Tolerance Patient limited by  pain from chest tube  Nurse made aware     No SOB observed  O2 sats 95-99% Fair  Good  with ADL activity    Visual/  Perceptual Glasses: none by bedside          UE ROM/strength  R  UE AROM WFLs  L - no shoulder ROM , sling   Slow moving digits - patient reports still feeling numbness   Pt completed distal AROM   Sling positioning instructed with assist required.  Tolerate UE therapeutic activity/exercises to increase strength/endurance for ADL/xfer activity  Education      Comments:  Pt laying in the bed.  Able to complete ADL with assist for shirt and sling.  Instructed with compensatory strategies for UB ADL.  Increased time required for self care activities.  He states he will have assist at home.  Nobody present for training during this session.     Education/treatment:  ADL retraining with facilitation of movement to increase self care skills. Therapeutic activity to address balance and endurance for ADL and transfers.  Pt education of compensatory strategies for UB ADL, home safety, sling positioning.       Pt has made  progress towards set goals.       Time In: 915  Time Out: 945     Min Units   Therapeutic Ex 92046 10 1   Therapeutic Activities [id number]     ADL/Self Care 27617 20 1   Orthotic Management 85559     Neuro Re-Ed 73838     Non-Billable Time     TOTAL TIMED TREATMENT 30 2         Livia STAFFORD/L 20005

## 2025-03-07 NOTE — PROGRESS NOTES
Department of Internal Medicine  Nephrology Nurse Practitioner Consult Note    Events reviewed.    SUBJECTIVE: We are following Hong Shah for hyponatremia. Patient reports no complaints.     PHYSICAL EXAM:      Vitals:    VITALS:  BP (!) 133/99   Pulse 92   Temp 98.8 °F (37.1 °C) (Oral)   Resp 16   Ht 1.803 m (5' 11\")   Wt 69.9 kg (154 lb 3.2 oz)   SpO2 99%   BMI 21.51 kg/m²   24HR INTAKE/OUTPUT:  No intake or output data in the 24 hours ending 03/07/25 1317    Scheduled Meds:   sodium chloride  2 g Oral TID WC    senna  1 tablet Oral Nightly    amLODIPine  5 mg Oral Daily    pantoprazole  40 mg Oral QAM AC    sodium chloride flush  5-40 mL IntraVENous 2 times per day    lidocaine  20 mL IntraDERmal Once    nicotine  1 patch TransDERmal Daily    aspirin  81 mg Oral BID     Continuous Infusions:   [Held by provider] sodium chloride Stopped (03/03/25 1445)    sodium chloride       PRN Meds:.sodium chloride flush, sodium chloride, ondansetron **OR** ondansetron, oxyCODONE **OR** oxyCODONE, morphine **OR** morphine     Constitutional:  Awake, alert, oriented, in NAD  HEENT:  PERRLA, normocephalic, atraumatic  Respiratory:  CTA  Cardiovascular/Edema:  RRR, normal S1, normal S2  Gastrointestinal:  Soft, flat, non-distended, non-tender  Neurologic:  Nonfocal  Skin:  warm, dry, no rashes, no lesions    DATA:    CBC:   Lab Results   Component Value Date/Time    WBC 12.4 03/05/2025 06:37 AM    RBC 4.11 03/05/2025 06:37 AM    HGB 13.3 03/05/2025 06:37 AM    HCT 38.0 03/05/2025 06:37 AM    MCV 92.5 03/05/2025 06:37 AM    MCH 32.4 03/05/2025 06:37 AM    MCHC 35.0 03/05/2025 06:37 AM    RDW 12.9 03/05/2025 06:37 AM     03/05/2025 06:37 AM    MPV 9.8 03/05/2025 06:37 AM     CMP:    Lab Results   Component Value Date/Time     03/07/2025 02:48 AM    K 4.1 03/07/2025 02:48 AM    CL 95 03/07/2025 02:48 AM    CO2 26 03/07/2025 02:48 AM    BUN 18 03/07/2025 02:48 AM    CREATININE 0.9 03/07/2025 02:48 AM    GFRAA  3/7/2025 at 1:17 PM     I saw and evaluated the patient, performing the key elements of the service. I discussed the findings, assessment and plan with NP and agree with her findings and plans as documented in her note.

## 2025-03-12 NOTE — DISCHARGE SUMMARY
Discharge Summary     Patient ID:  Hong Shah  [id number]  54 y.o.  1970    Admit date: 3/3/2025    Discharge date and time: 3/7/2025  2:04 PM     Admitting Physician: Stevan Fonseca DO     Discharge Physician: Same    Admission Diagnoses: Osteoarthritis left shoulder    Discharge Diagnoses: Same    Admission Condition: good    Discharged Condition: good    Procedure and Date: Left total shoulder arthroplasty    Hospital Course: Patient underwent left total shoulder arthroplasty as above.  He was admitted for overnight observation and medical management.  Following the procedure, he was found to have a large hemothorax on the left side.  General surgery was consulted and a chest tube was placed.  We also obtained a medical consult due to underlying comorbidities.  A nephrology consult was obtained by medicine due to hyponatremia.  Patient was able to be discharged on postop day #4 to place to previous residence.    Consults: Hospitalist, nephrology, general surgery    Significant Diagnostic Studies: Routine postoperative lab work, imaging, CT scan due to normal thorax on the left side    Discharge Exam:  BP (!) 133/99   Pulse 92   Temp 98.8 °F (37.1 °C) (Oral)   Resp 16   Ht 1.803 m (5' 11\")   Wt 69.9 kg (154 lb 3.2 oz)   SpO2 99%   BMI 21.51 kg/m²     General Appearance:    Alert, cooperative, no distress, appears stated age   Head:    Normocephalic, without obvious abnormality, atraumatic   Eyes:    PERRL, conjunctiva/corneas clear, EOM's intact, fundi     benign, both eyes        Ears:    Normal TM's and external ear canals, both ears   Nose:   Nares normal, septum midline, mucosa normal, no drainage    or sinus tenderness   Throat:   Lips, mucosa, and tongue normal; teeth and gums normal   Neck:   Supple, symmetrical, trachea midline, no adenopathy;        thyroid:  No enlargement/tenderness/nodules; no carotid    bruit or JVD   Back:     Symmetric, no curvature, ROM normal, no CVA tenderness

## 2025-03-13 ENCOUNTER — TELEPHONE (OUTPATIENT)
Dept: PRIMARY CARE CLINIC | Age: 55
End: 2025-03-13

## 2025-03-13 NOTE — TELEPHONE ENCOUNTER
Patient calling stating he has a tooth on the left side of his mouth that is swollen and painful that is causing his face to swell as well. Patient states the dentist told him to hold off until his shoulder was healed from his surgery but patient states the swelling is getting worse and is asking if there is anything you can give him over the phone or if you need to see him. Patient cannot drive right now due to surgery

## 2025-03-23 ENCOUNTER — HOSPITAL ENCOUNTER (EMERGENCY)
Age: 55
Discharge: HOME | End: 2025-03-23
Payer: COMMERCIAL

## 2025-03-23 VITALS
WEIGHT: 154 LBS | DIASTOLIC BLOOD PRESSURE: 104 MMHG | RESPIRATION RATE: 18 BRPM | SYSTOLIC BLOOD PRESSURE: 150 MMHG | OXYGEN SATURATION: 95 % | HEART RATE: 85 BPM | TEMPERATURE: 98.8 F | HEIGHT: 71 IN | BODY MASS INDEX: 21.56 KG/M2

## 2025-03-23 DIAGNOSIS — K08.89 PAIN, DENTAL: Primary | ICD-10-CM

## 2025-03-23 DIAGNOSIS — K13.70 ORAL LESION: ICD-10-CM

## 2025-03-23 PROCEDURE — 99283 EMERGENCY DEPT VISIT LOW MDM: CPT

## 2025-03-23 RX ORDER — CLINDAMYCIN HYDROCHLORIDE 150 MG/1
450 CAPSULE ORAL 3 TIMES DAILY
Qty: 63 CAPSULE | Refills: 0 | Status: SHIPPED | OUTPATIENT
Start: 2025-03-23 | End: 2025-03-30

## 2025-03-23 ASSESSMENT — LIFESTYLE VARIABLES
HOW OFTEN DO YOU HAVE A DRINK CONTAINING ALCOHOL: 4 OR MORE TIMES A WEEK
HOW MANY STANDARD DRINKS CONTAINING ALCOHOL DO YOU HAVE ON A TYPICAL DAY: 1 OR 2

## 2025-03-23 ASSESSMENT — PAIN DESCRIPTION - ONSET: ONSET: ON-GOING

## 2025-03-23 ASSESSMENT — PAIN SCALES - GENERAL: PAINLEVEL_OUTOF10: 5

## 2025-03-23 ASSESSMENT — PAIN - FUNCTIONAL ASSESSMENT
PAIN_FUNCTIONAL_ASSESSMENT: 0-10
PAIN_FUNCTIONAL_ASSESSMENT: ACTIVITIES ARE NOT PREVENTED

## 2025-03-23 ASSESSMENT — PAIN DESCRIPTION - PAIN TYPE: TYPE: ACUTE PAIN

## 2025-03-23 ASSESSMENT — PAIN DESCRIPTION - ORIENTATION: ORIENTATION: LEFT;LOWER

## 2025-03-23 ASSESSMENT — PAIN DESCRIPTION - LOCATION: LOCATION: TEETH

## 2025-03-23 ASSESSMENT — PAIN DESCRIPTION - DESCRIPTORS: DESCRIPTORS: POUNDING

## 2025-03-23 NOTE — ED PROVIDER NOTES
leukoplakia, ludwigs     Reassessment:  Patient continues to be non-toxic on re-evaluation.     Chronic Conditions affecting care:   Past Medical History:   Diagnosis Date    Alcohol abuse with intoxication, unspecified 8/15/2017    Arthritis     GERD (gastroesophageal reflux disease)      ED COURSE:        Any labs and imaging were reviewed by myself.     Consultations:  None  Discussion with Other Profesionals : None    COUNSELING:   I have spoken with the patient/caregiver and discussed today’s results, in addition to providing specific details for the plan of care and counseling regarding the diagnosis and prognosis and are agreeable with the plan. All results reviewed with pt and all questions answered.  I discussed the differential, results and discharge plan with the patient and/or family/friend/caregiver if present.  I emphasized the importance of follow-up with the physician I referred them to in the timeframe recommended.  I explained reasons for the patient to return to the Emergency Department. Additional verbal discharge instructions were also given and discussed with the patient to supplement those generated by the EMR. We also discussed medications that were prescribed (if any) including common side effects and interactions. The patient was advised to abstain from driving, operating heavy machinery or making significant decisions while taking medications such as opiates and muscle relaxers that may impair this. All questions were addressed.  They understand return precautions and discharge instructions. The patient and/or family/friend/caregiver expressed understanding. Vitals were stable and they were in no distress at discharge. Findings were discussed with the patient and reasons to immediately return to the ED were articulated to them.     I used an evidence-based tool along with my training and experience to weigh the risk of discharge against the risks of further testing, imaging, or

## 2025-03-23 NOTE — DISCHARGE INSTR - COC
Continuity of Care Form    Patient Name: Hong Shah   :  1970  MRN:  [id number]    Admit date:  3/23/2025  Discharge date:  ***    Code Status Order: Prior   Advance Directives:     Admitting Physician:  No admitting provider for patient encounter.  PCP: Danish Whalen DO    Discharging Nurse: ***  Discharging Hospital Unit/Room#: 35/35  Discharging Unit Phone Number: ***    Emergency Contact:   Extended Emergency Contact Information  Primary Emergency Contact: Bre Sharp   [facility]  Home Phone: [phone]  Mobile Phone: [phone]  Relation: Child  Secondary Emergency Contact: Yogi Shah  Address: [address] [place] [address]  Home Phone: [phone]  Work Phone: [phone]  Mobile Phone: [phone]  Relation: Child   needed? No    Past Surgical History:  Past Surgical History:   Procedure Laterality Date    CARDIAC CATHETERIZATION  2012    Dr. Schroeder    ENDOSCOPY, COLON, DIAGNOSTIC      FRACTURE SURGERY      fusion c5,6,7    NECK SURGERY      fusion    PAIN MANAGEMENT PROCEDURE N/A 2020    C7-T1 EPIDURAL STEROID INJECTION #1 performed by Earnestine Villarreal DO at [facility] OR    PAIN MANAGEMENT PROCEDURE N/A 2021    C7 - T1 EPIDURAL STEROID INJECTION  #2 performed by Earnestine Villarreal DO at [facility] OR    SHOULDER ARTHROPLASTY Left 3/3/2025    LEFT TOTAL SHOULDER ARTHROPLASTY performed by Stevan Fonseca DO at [facility] OR    SPINE SURGERY      Neck    WISDOM TOOTH EXTRACTION         Immunization History:   Immunization History   Administered Date(s) Administered    COVID-19, MODERNA BLUE border, Primary or Immunocompromised, (age 12y+), IM, 100 mcg/0.5mL 2021, 04/15/2021, 2021    Influenza, FLUARIX, FLULAVAL, FLUZONE (age 6 mo+) and AFLURIA, (age 3 y+), Quadv PF, 0.5mL 2022    Influenza, FLUCELVAX, (age 6 mo+), MDCK, Quadv PF, 0.5mL 2021    TDaP, ADACEL (age 10y-64y), BOOSTRIX (age 10y+), IM, 0.5mL 
External [place]

## (undated) DEVICE — 3M™ STERI-DRAPE™ U-DRAPE 1015: Brand: STERI-DRAPE™

## (undated) DEVICE — GAUZE,SPONGE,4"X4",12PLY,STERILE,LF,2'S: Brand: MEDLINE

## (undated) DEVICE — BLADE CLIPPER GEN PURP NS

## (undated) DEVICE — GLOVE ORANGE PI 7 1/2   MSG9075

## (undated) DEVICE — IMMOBILIZER SHLDR L10.5-17IN D7IN SLNG W/ 15DEG ABD PLLW

## (undated) DEVICE — 3M™ IOBAN™ 2 ANTIMICROBIAL INCISE DRAPE 6650EZ: Brand: IOBAN™ 2

## (undated) DEVICE — DRAPE,REIN 53X77,STERILE: Brand: MEDLINE

## (undated) DEVICE — MARKER,SKIN,WI/RULER AND LABELS: Brand: MEDLINE

## (undated) DEVICE — GLOVE ORANGE PI 8   MSG9080

## (undated) DEVICE — DRILL BIT 2.0MM (5/64'') X 128.0MM

## (undated) DEVICE — GOWN,SIRUS,FABRNF,XL,20/CS: Brand: MEDLINE

## (undated) DEVICE — 6 ML SYRINGE LUER-LOCK TIP: Brand: MONOJECT

## (undated) DEVICE — SYRINGE, LUER LOCK, 5ML: Brand: MEDLINE

## (undated) DEVICE — SOLUTION IRRIG 1000ML STRL H2O USP PLAS POUR BTL

## (undated) DEVICE — GAUZE,SPONGE,4"X4",16PLY,XRAY,STRL,LF: Brand: MEDLINE

## (undated) DEVICE — Z DISCONTINUED APPLICATOR SURG PREP 0.35OZ 2% CHG 70% ISO ALC W/ HI LT

## (undated) DEVICE — SYRINGE IRRIG 60ML SFT PLIABLE BLB EZ TO GRP 1 HND USE W/

## (undated) DEVICE — TOWEL,OR,DSP,ST,BLUE,STD,6/PK,12PK/CS: Brand: MEDLINE

## (undated) DEVICE — NEEDLE HYPO 18GA L1.5IN PNK POLYPR HUB S STL THN WALL FILL

## (undated) DEVICE — BOWL AND CEMENT CARTRIDGE WITH BREAKAWAY FEMORAL NOZZLE: Brand: ACM

## (undated) DEVICE — SPONGE LAP W18XL18IN WHT COT 4 PLY FLD STRUNG RADPQ DISP ST 2 PER PACK

## (undated) DEVICE — 12 ML SYRINGE,LUER-LOCK TIP: Brand: MONOJECT

## (undated) DEVICE — NEEDLE HYPO 25GA L1.5IN BLU POLYPR HUB S STL REG BVL STR

## (undated) DEVICE — DRESSING HYDROFIBER AQUACEL AG ADVANTAGE 3.5X10 IN

## (undated) DEVICE — Device

## (undated) DEVICE — GAUZE,SPONGE,4"X4",8PLY,STRL,LF,10/TRAY: Brand: MEDLINE

## (undated) DEVICE — 3M™ COBAN™ NL STERILE NON-LATEX SELF-ADHERENT WRAP, 2084S, 4 IN X 5 YD (10 CM X 4,5 M), 18 ROLLS/CASE: Brand: 3M™ COBAN™

## (undated) DEVICE — PACK,SHOULDER II,SIRUS: Brand: MEDLINE

## (undated) DEVICE — TRAY SPIDER ARM POSITIONER REUSABLE

## (undated) DEVICE — SOLUTION IRRIG 1000ML 0.9% SOD CHL USP POUR PLAS BTL

## (undated) DEVICE — 4-PORT MANIFOLD: Brand: NEPTUNE 2

## (undated) DEVICE — HANDPIECE SET WITH COAXIAL HIGH FLOW TIP AND SUCTION TUBE: Brand: INTERPULSE

## (undated) DEVICE — HEWSON SUTURE RETRIEVER: Brand: HEWSON SUTURE RETRIEVER

## (undated) DEVICE — BANDAGE ADH W1XL3IN NAT FAB WVN FLX DURABLE N ADH PD SEAL

## (undated) DEVICE — TUBING, SUCTION, 9/32" X 10', STRAIGHT: Brand: MEDLINE

## (undated) DEVICE — 3M™ RED DOT™ MONITORING ELECTRODE WITH FOAM TAPE AND STICKY GEL 2560, 50/BAG, 20/CASE, 72/PLT: Brand: RED DOT™

## (undated) DEVICE — TUBE IRRIG HNDPC HI FLO TP INTRPULS W/SUCTION TUBE

## (undated) DEVICE — 2108 SERIES SAGITTAL BLADE, OFFSET (20.0 X 0.89 X 80.0MM)

## (undated) DEVICE — SYRINGE 20ML LL S/C 50

## (undated) DEVICE — SHOULDER STABILIZATION KIT,                                    DISPOSABLE 12 PER BOX

## (undated) DEVICE — BLADE,STAINLESS-STEEL,10,STRL,DISPOSABLE: Brand: MEDLINE

## (undated) DEVICE — DRAPE,U/ SHT,SPLIT,PLAS,STERIL: Brand: MEDLINE

## (undated) DEVICE — KIT SURG W7XL11IN 2 PKT UNTREATED NA

## (undated) DEVICE — INTENT TO BE USED WITH SUTURE MATERIAL FOR TISSUE CLOSURE: Brand: RICHARD-ALLAN® NEEDLE 1/2 CIRCLE TAPER

## (undated) DEVICE — ELECTRODE PT RET AD L9FT HI MOIST COND ADH HYDRGEL CORDED

## (undated) DEVICE — PACKING,VAGINAL,XR,ST,4"X36",100EA: Brand: MEDLINE

## (undated) DEVICE — ALCOHOL RUBBING ISO 16OZ 70%

## (undated) DEVICE — DOUBLE BASIN SET: Brand: MEDLINE INDUSTRIES, INC.

## (undated) DEVICE — APPLICATOR MEDICATED 26 CC SOLUTION HI LT ORNG CHLORAPREP

## (undated) DEVICE — NON-DEHP CATHETER EXTENSION SET, MALE LUER LOCK ADAPTER

## (undated) DEVICE — PEEL-AWAY HOOD: Brand: FLYTE, SURGICOOL

## (undated) DEVICE — LIQUIBAND RAPID ADHESIVE 36/CS 0.8ML: Brand: MEDLINE

## (undated) DEVICE — PAD,ABDOMINAL,5"X9",ST,LF,25/BX: Brand: MEDLINE INDUSTRIES, INC.

## (undated) DEVICE — Device: Brand: PORTEX

## (undated) DEVICE — COVER,BOOT,FOAM,NON-SKID,HOOK-LOOP,XLG: Brand: MEDLINE INDUSTRIES, INC.

## (undated) DEVICE — BLADE ES L6IN ELASTOMERIC COAT EXT DURABLE BEND UPTO 90DEG

## (undated) DEVICE — 1000 S-DRAPE TOWEL DRAPE 10/BX: Brand: STERI-DRAPE™

## (undated) DEVICE — SOLUTION IRRIG 3000ML 0.9% SOD CHL USP UROMATIC PLAS CONT

## (undated) DEVICE — SYRINGE WITH HYPODERMIC SAFETY NEEDLE: Brand: MAGELLAN

## (undated) DEVICE — CONVERTORS STOCKINETTE: Brand: CONVERTORS

## (undated) DEVICE — T-MAX DISPOSABLE FACE MASK 8 PER BOX

## (undated) DEVICE — THIN FLEXIBLE NOZZLE

## (undated) DEVICE — GLOVE SURG SZ 8 L12IN FNGR THK79MIL GRN LTX FREE

## (undated) DEVICE — TUBING SUCT 12FR MAL ALUM SHFT FN CAP VENT UNIV CONN W/ OBT